# Patient Record
Sex: FEMALE | Race: ASIAN
[De-identification: names, ages, dates, MRNs, and addresses within clinical notes are randomized per-mention and may not be internally consistent; named-entity substitution may affect disease eponyms.]

---

## 2017-03-16 ENCOUNTER — RESULT REVIEW (OUTPATIENT)
Age: 37
End: 2017-03-16

## 2017-05-27 ENCOUNTER — RESULT REVIEW (OUTPATIENT)
Age: 37
End: 2017-05-27

## 2017-12-12 ENCOUNTER — EMERGENCY (EMERGENCY)
Facility: HOSPITAL | Age: 37
LOS: 1 days | Discharge: HOME | End: 2017-12-12
Attending: EMERGENCY MEDICINE | Admitting: EMERGENCY MEDICINE
Payer: COMMERCIAL

## 2017-12-12 VITALS
OXYGEN SATURATION: 99 % | SYSTOLIC BLOOD PRESSURE: 138 MMHG | DIASTOLIC BLOOD PRESSURE: 86 MMHG | HEIGHT: 63 IN | HEART RATE: 81 BPM | RESPIRATION RATE: 18 BRPM | WEIGHT: 165.35 LBS | TEMPERATURE: 99 F

## 2017-12-12 DIAGNOSIS — Z98.890 OTHER SPECIFIED POSTPROCEDURAL STATES: Chronic | ICD-10-CM

## 2017-12-12 LAB
ALBUMIN SERPL ELPH-MCNC: 3.5 G/DL — SIGNIFICANT CHANGE UP (ref 3.3–5)
ALP SERPL-CCNC: 69 U/L — SIGNIFICANT CHANGE UP (ref 40–120)
ALT FLD-CCNC: 22 U/L — SIGNIFICANT CHANGE UP (ref 12–78)
AMYLASE P1 CFR SERPL: 76 U/L — SIGNIFICANT CHANGE UP (ref 25–115)
ANION GAP SERPL CALC-SCNC: 6 MMOL/L — SIGNIFICANT CHANGE UP (ref 5–17)
APPEARANCE UR: CLEAR — SIGNIFICANT CHANGE UP
AST SERPL-CCNC: 18 U/L — SIGNIFICANT CHANGE UP (ref 15–37)
BASOPHILS # BLD AUTO: 0.1 K/UL — SIGNIFICANT CHANGE UP (ref 0–0.2)
BASOPHILS NFR BLD AUTO: 0.6 % — SIGNIFICANT CHANGE UP (ref 0–2)
BILIRUB SERPL-MCNC: 0.3 MG/DL — SIGNIFICANT CHANGE UP (ref 0.2–1.2)
BILIRUB UR-MCNC: NEGATIVE — SIGNIFICANT CHANGE UP
BUN SERPL-MCNC: 9 MG/DL — SIGNIFICANT CHANGE UP (ref 7–23)
CALCIUM SERPL-MCNC: 8.5 MG/DL — SIGNIFICANT CHANGE UP (ref 8.5–10.1)
CHLORIDE SERPL-SCNC: 105 MMOL/L — SIGNIFICANT CHANGE UP (ref 96–108)
CK MB BLD-MCNC: 2.3 % — SIGNIFICANT CHANGE UP (ref 0–3.5)
CK MB CFR SERPL CALC: 1.2 NG/ML — SIGNIFICANT CHANGE UP (ref 0–3.6)
CK SERPL-CCNC: 52 U/L — SIGNIFICANT CHANGE UP (ref 26–192)
CO2 SERPL-SCNC: 28 MMOL/L — SIGNIFICANT CHANGE UP (ref 22–31)
COLOR SPEC: YELLOW — SIGNIFICANT CHANGE UP
CREAT SERPL-MCNC: 0.55 MG/DL — SIGNIFICANT CHANGE UP (ref 0.5–1.3)
DIFF PNL FLD: NEGATIVE — SIGNIFICANT CHANGE UP
EOSINOPHIL # BLD AUTO: 0.3 K/UL — SIGNIFICANT CHANGE UP (ref 0–0.5)
EOSINOPHIL NFR BLD AUTO: 2.7 % — SIGNIFICANT CHANGE UP (ref 0–6)
GLUCOSE SERPL-MCNC: 77 MG/DL — SIGNIFICANT CHANGE UP (ref 70–99)
GLUCOSE UR QL: NEGATIVE — SIGNIFICANT CHANGE UP
HCT VFR BLD CALC: 38.6 % — SIGNIFICANT CHANGE UP (ref 34.5–45)
HGB BLD-MCNC: 12.5 G/DL — SIGNIFICANT CHANGE UP (ref 11.5–15.5)
KETONES UR-MCNC: NEGATIVE — SIGNIFICANT CHANGE UP
LEUKOCYTE ESTERASE UR-ACNC: ABNORMAL
LIDOCAIN IGE QN: 287 U/L — SIGNIFICANT CHANGE UP (ref 73–393)
LYMPHOCYTES # BLD AUTO: 2.9 K/UL — SIGNIFICANT CHANGE UP (ref 1–3.3)
LYMPHOCYTES # BLD AUTO: 23.9 % — SIGNIFICANT CHANGE UP (ref 13–44)
MCHC RBC-ENTMCNC: 28.6 PG — SIGNIFICANT CHANGE UP (ref 27–34)
MCHC RBC-ENTMCNC: 32.5 GM/DL — SIGNIFICANT CHANGE UP (ref 32–36)
MCV RBC AUTO: 88 FL — SIGNIFICANT CHANGE UP (ref 80–100)
MONOCYTES # BLD AUTO: 0.7 K/UL — SIGNIFICANT CHANGE UP (ref 0–0.9)
MONOCYTES NFR BLD AUTO: 6 % — SIGNIFICANT CHANGE UP (ref 1–9)
NEUTROPHILS # BLD AUTO: 8.2 K/UL — HIGH (ref 1.8–7.4)
NEUTROPHILS NFR BLD AUTO: 66.8 % — SIGNIFICANT CHANGE UP (ref 43–77)
NITRITE UR-MCNC: NEGATIVE — SIGNIFICANT CHANGE UP
PH UR: 7 — SIGNIFICANT CHANGE UP (ref 5–8)
PLATELET # BLD AUTO: 245 K/UL — SIGNIFICANT CHANGE UP (ref 150–400)
POTASSIUM SERPL-MCNC: 3.5 MMOL/L — SIGNIFICANT CHANGE UP (ref 3.5–5.3)
POTASSIUM SERPL-SCNC: 3.5 MMOL/L — SIGNIFICANT CHANGE UP (ref 3.5–5.3)
PROT SERPL-MCNC: 7.7 G/DL — SIGNIFICANT CHANGE UP (ref 6–8.3)
PROT UR-MCNC: NEGATIVE — SIGNIFICANT CHANGE UP
RBC # BLD: 4.38 M/UL — SIGNIFICANT CHANGE UP (ref 3.8–5.2)
RBC # FLD: 11.8 % — SIGNIFICANT CHANGE UP (ref 10.3–14.5)
SODIUM SERPL-SCNC: 139 MMOL/L — SIGNIFICANT CHANGE UP (ref 135–145)
SP GR SPEC: 1 — LOW (ref 1.01–1.02)
TROPONIN I SERPL-MCNC: <.015 NG/ML — SIGNIFICANT CHANGE UP (ref 0.01–0.04)
UROBILINOGEN FLD QL: NEGATIVE — SIGNIFICANT CHANGE UP
WBC # BLD: 12.3 K/UL — HIGH (ref 3.8–10.5)
WBC # FLD AUTO: 12.3 K/UL — HIGH (ref 3.8–10.5)

## 2017-12-12 PROCEDURE — 99284 EMERGENCY DEPT VISIT MOD MDM: CPT

## 2017-12-12 RX ORDER — SODIUM CHLORIDE 9 MG/ML
3 INJECTION INTRAMUSCULAR; INTRAVENOUS; SUBCUTANEOUS ONCE
Qty: 0 | Refills: 0 | Status: COMPLETED | OUTPATIENT
Start: 2017-12-12 | End: 2017-12-12

## 2017-12-12 RX ADMIN — SODIUM CHLORIDE 3 MILLILITER(S): 9 INJECTION INTRAMUSCULAR; INTRAVENOUS; SUBCUTANEOUS at 21:48

## 2017-12-12 NOTE — ED ADULT TRIAGE NOTE - CHIEF COMPLAINT QUOTE
9 weeks pregnant, states she is fatigue and intermittent chest pressure only at night. for last 5 days, history of open heart surgery 6 years ago.

## 2017-12-12 NOTE — ED ADULT NURSE NOTE - OBJECTIVE STATEMENT
c/o "I feel like someone is squeezing my chest"  and I am weak.  reports being pregnant in 1st trimester.  denies SOB skin warm and dry

## 2017-12-13 VITALS
RESPIRATION RATE: 16 BRPM | OXYGEN SATURATION: 99 % | TEMPERATURE: 98 F | DIASTOLIC BLOOD PRESSURE: 72 MMHG | HEART RATE: 65 BPM | SYSTOLIC BLOOD PRESSURE: 109 MMHG

## 2017-12-13 LAB — HCG SERPL-ACNC: SIGNIFICANT CHANGE UP MIU/ML

## 2017-12-13 PROCEDURE — 99284 EMERGENCY DEPT VISIT MOD MDM: CPT | Mod: 25

## 2017-12-13 PROCEDURE — 36415 COLL VENOUS BLD VENIPUNCTURE: CPT

## 2017-12-13 PROCEDURE — 85027 COMPLETE CBC AUTOMATED: CPT

## 2017-12-13 PROCEDURE — 84484 ASSAY OF TROPONIN QUANT: CPT

## 2017-12-13 PROCEDURE — 84702 CHORIONIC GONADOTROPIN TEST: CPT

## 2017-12-13 PROCEDURE — 82550 ASSAY OF CK (CPK): CPT

## 2017-12-13 PROCEDURE — 82150 ASSAY OF AMYLASE: CPT

## 2017-12-13 PROCEDURE — 93005 ELECTROCARDIOGRAM TRACING: CPT

## 2017-12-13 PROCEDURE — 81001 URINALYSIS AUTO W/SCOPE: CPT

## 2017-12-13 PROCEDURE — 87086 URINE CULTURE/COLONY COUNT: CPT

## 2017-12-13 PROCEDURE — 83690 ASSAY OF LIPASE: CPT

## 2017-12-13 PROCEDURE — 80053 COMPREHEN METABOLIC PANEL: CPT

## 2017-12-13 PROCEDURE — 82553 CREATINE MB FRACTION: CPT

## 2017-12-13 RX ORDER — CEPHALEXIN 500 MG
500 CAPSULE ORAL ONCE
Qty: 0 | Refills: 0 | Status: COMPLETED | OUTPATIENT
Start: 2017-12-13 | End: 2017-12-13

## 2017-12-13 RX ORDER — CEPHALEXIN 500 MG
1 CAPSULE ORAL
Qty: 14 | Refills: 0 | OUTPATIENT
Start: 2017-12-13 | End: 2017-12-19

## 2017-12-13 RX ADMIN — Medication 500 MILLIGRAM(S): at 01:45

## 2017-12-13 NOTE — ED PROVIDER NOTE - PROGRESS NOTE DETAILS
Spoke with Dr. Valentino, results reviewed.  He does not believe that this is cardiac in nature.  Pt can be discharged home with follow up in the office.  Reviewed with pt and family all questions answered stable for discharge home with outpatient follow up

## 2017-12-13 NOTE — ED PROVIDER NOTE - MEDICAL DECISION MAKING DETAILS
cbc, cmp, amylase, lipase, UA, urine culture, pregnancy, EKG, cardiac enzymes, consult with pt cardiologist

## 2017-12-13 NOTE — ED PROVIDER NOTE - PLAN OF CARE
Return to the ED for any new or worsening symptoms  Take your medication as prescribed  Keflex 1 tab 2 times for UTI finish the prescription  Continue your pre- vitamins   Follow up with cardiology call tomorrow to schedule an appointment   Follow up with your OBGYN call tomorrow to schedule follow up   Increase your water intake   Advance activity as tolerated

## 2017-12-13 NOTE — ED PROVIDER NOTE - OBJECTIVE STATEMENT
Pt is a 38 yo female who presents to the ED with a cc of chest pressure.  PMHx of congential atrial abnormality s/p repair.  Pt reports that she is approx 9 weeks pregnant.  She is a  s/p normal spontaneous vaginal delivery.  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  The pregnancy had to be terminated because of increased stress on the heart and pt underwent surgerical repair with a Dr. Valentino.  She reports that this pregnancy has been without complication.  She has had an ultrasound to confirm intrauterine growth and follows with [facility]. She states that on Friday she developed a pain to her left upper chest wall intermittent in nature.  She reports that it feels like someone is squeezing her.  Symptoms are self limited lasting 15-20 min at a time and are associated with SOB.  She reports that they will occasionally awake her from sleep.  She further reports increased fatigue, and chills.  Pt did have nausea and vomiting approx 2 days ago which have since resolved.    Denies fever, D/C, cough, abdominal pain, vaginal bleeding or discharge.  Denies dysuria, frequency, urgency.  Pt reports that the pain is not related to exertion but appears worse at night.    LMP 10/12.  Pt is currently chest pain free

## 2017-12-14 LAB
CULTURE RESULTS: NO GROWTH — SIGNIFICANT CHANGE UP
SPECIMEN SOURCE: SIGNIFICANT CHANGE UP

## 2017-12-15 ENCOUNTER — APPOINTMENT (OUTPATIENT)
Dept: OBGYN | Facility: CLINIC | Age: 37
End: 2017-12-15

## 2018-03-12 ENCOUNTER — OUTPATIENT (OUTPATIENT)
Dept: OUTPATIENT SERVICES | Age: 38
LOS: 1 days | Discharge: HOME | End: 2018-03-12

## 2018-03-12 DIAGNOSIS — Z98.890 OTHER SPECIFIED POSTPROCEDURAL STATES: Chronic | ICD-10-CM

## 2018-03-23 ENCOUNTER — APPOINTMENT (OUTPATIENT)
Dept: PEDIATRIC CARDIOLOGY | Facility: CLINIC | Age: 38
End: 2018-03-23
Payer: COMMERCIAL

## 2018-03-23 PROCEDURE — 76827 ECHO EXAM OF FETAL HEART: CPT

## 2018-03-23 PROCEDURE — 93325 DOPPLER ECHO COLOR FLOW MAPG: CPT

## 2018-03-23 PROCEDURE — 76825 ECHO EXAM OF FETAL HEART: CPT

## 2018-05-11 ENCOUNTER — EMERGENCY (EMERGENCY)
Facility: HOSPITAL | Age: 38
LOS: 1 days | Discharge: HOME | End: 2018-05-11
Attending: EMERGENCY MEDICINE | Admitting: EMERGENCY MEDICINE
Payer: COMMERCIAL

## 2018-05-11 VITALS
DIASTOLIC BLOOD PRESSURE: 70 MMHG | OXYGEN SATURATION: 100 % | RESPIRATION RATE: 16 BRPM | SYSTOLIC BLOOD PRESSURE: 122 MMHG | HEART RATE: 78 BPM

## 2018-05-11 VITALS
TEMPERATURE: 98 F | OXYGEN SATURATION: 100 % | SYSTOLIC BLOOD PRESSURE: 118 MMHG | DIASTOLIC BLOOD PRESSURE: 84 MMHG | HEART RATE: 93 BPM | RESPIRATION RATE: 16 BRPM

## 2018-05-11 DIAGNOSIS — R07.89 OTHER CHEST PAIN: ICD-10-CM

## 2018-05-11 DIAGNOSIS — Z98.890 OTHER SPECIFIED POSTPROCEDURAL STATES: Chronic | ICD-10-CM

## 2018-05-11 LAB
ALBUMIN SERPL ELPH-MCNC: 3.7 G/DL — SIGNIFICANT CHANGE UP (ref 3.3–5)
ALP SERPL-CCNC: 113 U/L — SIGNIFICANT CHANGE UP (ref 40–120)
ALT FLD-CCNC: 13 U/L — SIGNIFICANT CHANGE UP (ref 4–33)
APTT BLD: 28.3 SEC — SIGNIFICANT CHANGE UP (ref 27.5–37.4)
AST SERPL-CCNC: 16 U/L — SIGNIFICANT CHANGE UP (ref 4–32)
BASE EXCESS BLDV CALC-SCNC: 0.6 MMOL/L — SIGNIFICANT CHANGE UP
BASOPHILS # BLD AUTO: 0.03 K/UL — SIGNIFICANT CHANGE UP (ref 0–0.2)
BASOPHILS NFR BLD AUTO: 0.2 % — SIGNIFICANT CHANGE UP (ref 0–2)
BILIRUB SERPL-MCNC: 0.3 MG/DL — SIGNIFICANT CHANGE UP (ref 0.2–1.2)
BLOOD GAS VENOUS - CREATININE: 0.6 MG/DL — SIGNIFICANT CHANGE UP (ref 0.5–1.3)
BUN SERPL-MCNC: 6 MG/DL — LOW (ref 7–23)
CALCIUM SERPL-MCNC: 9.1 MG/DL — SIGNIFICANT CHANGE UP (ref 8.4–10.5)
CHLORIDE BLDV-SCNC: 106 MMOL/L — SIGNIFICANT CHANGE UP (ref 96–108)
CHLORIDE SERPL-SCNC: 100 MMOL/L — SIGNIFICANT CHANGE UP (ref 98–107)
CK SERPL-CCNC: 40 U/L — SIGNIFICANT CHANGE UP (ref 25–170)
CO2 SERPL-SCNC: 26 MMOL/L — SIGNIFICANT CHANGE UP (ref 22–31)
CREAT SERPL-MCNC: 0.63 MG/DL — SIGNIFICANT CHANGE UP (ref 0.5–1.3)
EOSINOPHIL # BLD AUTO: 0.16 K/UL — SIGNIFICANT CHANGE UP (ref 0–0.5)
EOSINOPHIL NFR BLD AUTO: 1.3 % — SIGNIFICANT CHANGE UP (ref 0–6)
GAS PNL BLDV: 136 MMOL/L — SIGNIFICANT CHANGE UP (ref 136–146)
GLUCOSE BLDV-MCNC: 80 — SIGNIFICANT CHANGE UP (ref 70–99)
GLUCOSE SERPL-MCNC: 98 MG/DL — SIGNIFICANT CHANGE UP (ref 70–99)
HCO3 BLDV-SCNC: 22 MMOL/L — SIGNIFICANT CHANGE UP (ref 20–27)
HCT VFR BLD CALC: 40.5 % — SIGNIFICANT CHANGE UP (ref 34.5–45)
HCT VFR BLDV CALC: 40.8 % — SIGNIFICANT CHANGE UP (ref 34.5–45)
HGB BLD-MCNC: 12.9 G/DL — SIGNIFICANT CHANGE UP (ref 11.5–15.5)
HGB BLDV-MCNC: 13.3 G/DL — SIGNIFICANT CHANGE UP (ref 11.5–15.5)
IMM GRANULOCYTES # BLD AUTO: 0.08 # — SIGNIFICANT CHANGE UP
IMM GRANULOCYTES NFR BLD AUTO: 0.7 % — SIGNIFICANT CHANGE UP (ref 0–1.5)
INR BLD: 0.97 — SIGNIFICANT CHANGE UP (ref 0.88–1.17)
LACTATE BLDV-MCNC: 2 MMOL/L — SIGNIFICANT CHANGE UP (ref 0.5–2)
LYMPHOCYTES # BLD AUTO: 1.79 K/UL — SIGNIFICANT CHANGE UP (ref 1–3.3)
LYMPHOCYTES # BLD AUTO: 14.9 % — SIGNIFICANT CHANGE UP (ref 13–44)
MCHC RBC-ENTMCNC: 27.9 PG — SIGNIFICANT CHANGE UP (ref 27–34)
MCHC RBC-ENTMCNC: 31.9 % — LOW (ref 32–36)
MCV RBC AUTO: 87.7 FL — SIGNIFICANT CHANGE UP (ref 80–100)
MONOCYTES # BLD AUTO: 0.47 K/UL — SIGNIFICANT CHANGE UP (ref 0–0.9)
MONOCYTES NFR BLD AUTO: 3.9 % — SIGNIFICANT CHANGE UP (ref 2–14)
NEUTROPHILS # BLD AUTO: 9.52 K/UL — HIGH (ref 1.8–7.4)
NEUTROPHILS NFR BLD AUTO: 79 % — HIGH (ref 43–77)
NRBC # FLD: 0 — SIGNIFICANT CHANGE UP
PCO2 BLDV: 54 MMHG — HIGH (ref 41–51)
PH BLDV: 7.3 PH — LOW (ref 7.32–7.43)
PLATELET # BLD AUTO: 234 K/UL — SIGNIFICANT CHANGE UP (ref 150–400)
PMV BLD: 11.9 FL — SIGNIFICANT CHANGE UP (ref 7–13)
PO2 BLDV: < 24 MMHG — LOW (ref 35–40)
POTASSIUM BLDV-SCNC: 3.7 MMOL/L — SIGNIFICANT CHANGE UP (ref 3.4–4.5)
POTASSIUM SERPL-MCNC: 3.9 MMOL/L — SIGNIFICANT CHANGE UP (ref 3.5–5.3)
POTASSIUM SERPL-SCNC: 3.9 MMOL/L — SIGNIFICANT CHANGE UP (ref 3.5–5.3)
PROT SERPL-MCNC: 7.1 G/DL — SIGNIFICANT CHANGE UP (ref 6–8.3)
PROTHROM AB SERPL-ACNC: 10.8 SEC — SIGNIFICANT CHANGE UP (ref 9.8–13.1)
RBC # BLD: 4.62 M/UL — SIGNIFICANT CHANGE UP (ref 3.8–5.2)
RBC # FLD: 12.8 % — SIGNIFICANT CHANGE UP (ref 10.3–14.5)
SAO2 % BLDV: 23.9 % — LOW (ref 60–85)
SODIUM SERPL-SCNC: 138 MMOL/L — SIGNIFICANT CHANGE UP (ref 135–145)
TROPONIN T SERPL-MCNC: < 0.06 NG/ML — SIGNIFICANT CHANGE UP (ref 0–0.06)
WBC # BLD: 12.05 K/UL — HIGH (ref 3.8–10.5)
WBC # FLD AUTO: 12.05 K/UL — HIGH (ref 3.8–10.5)

## 2018-05-11 PROCEDURE — 93306 TTE W/DOPPLER COMPLETE: CPT | Mod: 26

## 2018-05-11 PROCEDURE — 99285 EMERGENCY DEPT VISIT HI MDM: CPT

## 2018-05-11 PROCEDURE — 99284 EMERGENCY DEPT VISIT MOD MDM: CPT

## 2018-05-11 NOTE — ED PROVIDER NOTE - PLAN OF CARE
You were evaluated by cardiology and had an echocardiogram. Follow up with your PMD or cardiologist within 1-2 days of discharge.

## 2018-05-11 NOTE — CONSULT NOTE ADULT - ASSESSMENT
Impression: 38 y/o F  PMHx significant for unspecified abnormality of RA, s/p surgical procedure in 2012 at [facility] who p/w intermittent L sided chest pain and dyspnea x 3 days. Jorge negative x 1, pending Echo.    Recommendations:  - To be d/w attending Impression: 36 y/o F  PMHx significant for unspecified abnormality of RA, s/p surgical procedure in 2012 at [facility] who p/w intermittent L sided chest pain and dyspnea x 3 days. Jorge negative x 1, pending Echo. Low suspicion of PE.    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 Jorge negative x 1, pending Echo. Low suspicion of PE.    Recommendations:  - No need to further trend Jorge  - F/u TTE  - Cardiology team will continue to follow    Plan d/w EM Attending

## 2018-05-11 NOTE — ED PROVIDER NOTE - CARE PLAN
Principal Discharge DX:	Chest pressure  Assessment and plan of treatment:	You were evaluated by cardiology and had an echocardiogram. Follow up with your PMD or cardiologist within 1-2 days of discharge.

## 2018-05-11 NOTE — CHART NOTE - NSCHARTNOTEFT_GEN_A_CORE
Pt seen and evaluated by cardiology team.  By hx and according to cardiologist, thus have triscuspid regurgitation.       Plan  - TTE pending  - if normal, preserved EF, no evidence of severe MR, no cardiac contraindications for discharge     Jarrett Giron MD   [facility] [id number]

## 2018-05-11 NOTE — ED ADULT TRIAGE NOTE - CHIEF COMPLAINT QUOTE
30 weeks pregnancy, , c/o chest pressure, fatigue, and shortness of breath x few days, Discussed L&D MADI Cesar, to be seen in ED.    Denies: dizziness, HA, abdominal pain, vaginal discharge. Hx: congenital heart murmur   OSCAR: 2018

## 2018-05-11 NOTE — ED PROVIDER NOTE - OBJECTIVE STATEMENT
36 yo F pregnant female presenting with intermittent chest pain x 3 days. Pt has uncertain congenital atrial abnormality which required cardiac surgery - corrected in 2012. Came to ED because of concern of chest pain. Also being monitored for possible gestational diabetes. Denies fevers, chills, cough, rhinorrhea, otorrhea, otalgia, nausea, vomiting, constipation, diarrhea, shortness of breath or changes in urinary habits. Pt also complains of fatigue and weakness.

## 2018-05-11 NOTE — ED PROVIDER NOTE - MEDICAL DECISION MAKING DETAILS
39 yo F 30 week preg - OB for Fetal tracing given gestation, labs, cards for echo - no SOB, LE edema, calf tenderness, tachycardia or hypoxia - no concern for PE given history

## 2018-05-11 NOTE — CONSULT NOTE ADULT - SUBJECTIVE AND OBJECTIVE BOX
HPI    37y  at 30wks who presents with left sided chest pressure for the past 3 days. Patient has a PMH of congenital right heart disease. Per patient, she was born with a murmur and her "right ventricle" was not developing so she had open heart surgery in  with [facility] Dr. Gutierrez. She has been seeing Dr. Gutierrez throughout her pregnancy and had ECHOS recently which were WNL.    +FM, no LOF, no VB, no CTXs.    OB/GYN HISTORY:  x 1  PAST MEDICAL & SURGICAL HISTORY:  Congenital abnormality of right atrium  History of open heart surgery    Allergies  No Known Allergies    MEDICATIONS  (STANDING): none    MEDICATIONS  (PRN): none    FAMILY HISTORY:  No pertinent family history in first degree relatives    SOCIAL HISTORY:  REVIEW OF SYSTEMS:    CONSTITUTIONAL: No weakness, fevers or chills  EYES/ENT: No visual changes;  No vertigo or throat pain   NECK: No pain or stiffness  RESPIRATORY: shortness of breath  CARDIOVASCULAR: chest heaviness  GASTROINTESTINAL: No abdominal or epigastric pain. No nausea, vomiting, or hematemesis; No diarrhea or constipation. No melena or hematochezia.  GENITOURINARY: No dysuria, frequency or hematuria  NEUROLOGICAL: No numbness or weakness  SKIN: No itching, burning, rashes, or lesions   All other review of systems is negative unless indicated above.    Name of GYN Physician: Suman LEW    Vital Signs Last 24 Hrs  T(C): 36.6 (11 May 2018 11:24), Max: 36.6 (11 May 2018 11:24)  T(F): 97.9 (11 May 2018 11:24), Max: 97.9 (11 May 2018 11:24)  HR: 84 (11 May 2018 13:33) (84 - 93)  BP: 118/74 (11 May 2018 13:33) (118/74 - 118/84)  BP(mean): --  RR: 18 (11 May 2018 13:33) (16 - 18)  SpO2: 100% (11 May 2018 13:33) (100% - 100%)    PHYSICAL EXAM:    Constitutional: alert and oriented x 3  Abd: soft, gravid, nontender  Ext: NTBL    LABS:                        12.9   12.05 )-----------( 234      ( 11 May 2018 12:10 )             40.5     05-11    138  |  100  |  6<L>  ----------------------------<  98  3.9   |  26  |  0.63    Ca    9.1      11 May 2018 12:10    TPro  7.1  /  Alb  3.7  /  TBili  0.3  /  DBili  x   /  AST  16  /  ALT  13  /  AlkPhos  113  05-11    PT/INR - ( 11 May 2018 12:10 )   PT: 10.8 SEC;   INR: 0.97          PTT - ( 11 May 2018 12:10 )  PTT:28.3 SEC

## 2018-05-11 NOTE — ED ADULT NURSE NOTE - OBJECTIVE STATEMENT
pt alert,oriented x3. skin warm,dry. c/o increasing diff breathing. ch pains x past 3 days. reports tiredness. iv access,labs sent. c monitor rsr,mds to eval . denies abd pains,bleeding,spotting

## 2018-05-11 NOTE — ED ADULT NURSE REASSESSMENT NOTE - NS ED NURSE REASSESS COMMENT FT1
pt eval by obgyn awaits bedside echo. remains alert,oriented x3.. denies c/o ch pains,diff  breathing pt at rest.

## 2018-05-11 NOTE — CONSULT NOTE ADULT - SUBJECTIVE AND OBJECTIVE BOX
Wero Adler MD  PGY-1 | Preliminary Resident  Department of Internal Medicine  [phone] ([facility]) | [id number] ([facility])    Patient seen and evaluated at bedside    Chief Complaint: L chest pain, dyspnea    HPI: 36 y/o F  PMHx significant for unspecified open heart procedure (patient states she was born with a "heart murmur" and states her issue involved "Muscle growth" in R heart) in  at [facility] who p/w intermittent L sided chest pain and dyspnea x 3 days. Patient states she has been experiencing brief, seconds-long episodes of L sides chest pain characterized as "pressure." She denies radiation to the jaw or arms. No provocating or palliating factors noted. Pain is not worsened with breathing or movement. Patient states she was in the car for about 2 hours this weekend during a trip to [place], but denies any longer trips recently. She denies any sensation of palpitations, orthopnea, or PND; however, she has been having occasional difficulty sleeping and her  notes she breathes deeply at times when she has trouble falling asleep. Patient also says she "feels underwater" when these episodes of chest pain occur. Patient notes having similar symptoms in December when she was ~9 weeks pregnant, got worked up and was reassured her symptoms were not cardiac in origin.     PMHx:   Congenital abnormality of right atrium      PSHx:   History of open heart surgery  Appendectomy 2009      Allergies:  No Known Allergies      Medications:   Pre- vitamins    FAMILY HISTORY:  CAD, father s/p angioplasty  HTN, T2DM, mother    Social History: Just took leave from work. Works as a  in [place]. Lives with  at home  Smoking History: Denies  Alcohol Use: Denies  Drug Use: Denies    Review of Systems:  REVIEW OF SYSTEMS:  HEENT: +Nasal congestion  CARDIOVASCULAR: Per HPI  All other review of systems is negative unless indicated above.    Physical Exam:  T(F): 97.9 (05-11), Max: 97.9 (-11)  HR: 93 (-11) (93 - 93)  BP: 118/84 (-11) (118/84 - 118/84)  RR: 16 (-11)  SpO2: 100% (-)  GENERAL: No acute distress, well-developed  HEAD:  Atraumatic, Normocephalic  ENT: EOMI, PERRLA, conjunctiva and sclera clear, Neck supple, No JVD, moist mucosa  CHEST/LUNG: Diminished breath sounds at B/L bases  BACK: No spinal tenderness  HEART: Regular rate and rhythm; 1/6 systolic murmur LUSB. Trace LE edema B/L  ABDOMEN: Gravid. +BS  EXTREMITIES:  No erythema or unilateral swelling of LE. No pain to palpation of calves.   PSYCH: Nl behavior, nl affect  NEUROLOGY: AAOx3, non-focal, cranial nerves intact  SKIN: Normal color, No rashes or lesions  LINES: PIV    Cardiovascular Diagnostic Testing:    ECG: Personally reviewed [X} NSR. incomplete RBBB                                                         Echo: Personally reviewed: < from: Echocardiogram, Pediatric (18 @ 09:16) >  Summary:   1. Normal fetal echocardiogram.   2. Fetal heart rhythm during exam: normal fetal rhythm. Brief short episodes of sinus bradycardia during study (mother said she did not eat anything for breakfast).   3. The mechanical IA interval is normal at 116 msec.   4. Findings of the fetal study were discussed with the patient. Intrauterine course and post kayla course of the above cardiac findings were also discussed.   5. Mother going to obstetrician tomorrow.    < end of copied text >      Stress Testing:    Cath:    Imaging:    CXR: Personally reviewed    Labs: Personally reviewed                        12.   1205 )-----------( 234      ( 11 May 2018 12:10 )             40.5     05-11    138  |  100  |  6<L>  ----------------------------<  98  3.9   |  26  |  0.63    Ca    9.1      11 May 2018 12:10    TPro  7.1  /  Alb  3.7  /  TBili  0.3  /  DBili  x   /  AST  16  /  ALT  13  /  AlkPhos  113  05-11    PT/INR - ( 11 May 2018 12:10 )   PT: 10.8 SEC;   INR: 0.97          PTT - ( 11 May 2018 12:10 )  PTT:28.3 SEC  CARDIAC MARKERS ( 11 May 2018 12:10 )  x     / < 0.06 ng/mL / 40 u/L / x     / x Wero Adler MD  PGY-1 | Preliminary Resident  Department of Internal Medicine  [phone] ([facility]) | [phone] ([facility]    Patient seen and evaluated at bedside    Chief Complaint: L chest pain, dyspnea    HPI: 36 y/o F  PMHx significant for unspecified open heart procedure (patient states she was born with a "heart murmur" and states her issue involved "Muscle growth" in R heart) in  at [facility] who p/w intermittent L sided chest pain and dyspnea x 3 days. Patient states she has been experiencing brief, seconds-long episodes of L sided chest pain characterized as "pressure." She denies radiation to the jaw or arms. No provocating or palliating factors noted. Pain is not worsened with breathing or movement. Patient states she was in the car for about 2 hours this weekend during a trip to [place], but denies any longer trips recently. She denies any sensation of palpitations, orthopnea, or PND; however, she has been having occasional difficulty sleeping and her  notes she breathes deeply at times when she has trouble falling asleep. Patient also says she "feels underwater" when these episodes of chest pain occur. Patient notes having similar symptoms in December when she was ~9 weeks pregnant, got worked up and was reassured her symptoms were not cardiac in origin.     PMHx:   Congenital abnormality of right atrium      PSHx:   History of open heart surgery  Appendectomy 2009      Allergies:  No Known Allergies      Medications:   Pre- vitamins    FAMILY HISTORY:  CAD, father s/p angioplasty  HTN, T2DM, mother    Social History: Just took leave from work. Works as a  in [place]. Lives with  at home.  Smoking History: Denies  Alcohol Use: Denies  Drug Use: Denies    Review of Systems:  REVIEW OF SYSTEMS:  HEENT: +Nasal congestion  CARDIOVASCULAR: Per HPI  All other review of systems is negative unless indicated above.    Physical Exam:  T(F): 97.9 (05-11), Max: 97.9 (-11)  HR: 93 (-11) (93 - 93)  BP: 118/84 (05-11) (118/84 - 118/84)  RR: 16 (-11)  SpO2: 100% (-)  GENERAL: No acute distress, well-developed  HEAD:  Atraumatic, Normocephalic  ENT: EOMI, PERRLA, conjunctiva and sclera clear, Neck supple, No JVD, moist mucosa  CHEST/LUNG: Diminished breath sounds at B/L bases  BACK: No spinal tenderness  HEART: Regular rate and rhythm; 1/6 systolic murmur LUSB. Trace LE edema B/L  ABDOMEN: Gravid. +BS  EXTREMITIES:  No erythema or unilateral swelling of LE. No pain to palpation of calves.   PSYCH: Nl behavior, nl affect  NEUROLOGY: AAOx3, non-focal, cranial nerves intact  SKIN: Normal color, No rashes or lesions  LINES: PIV    Cardiovascular Diagnostic Testing:    ECG: Personally reviewed [X} NSR. incomplete RBBB                                                         Echo: Personally reviewed: < from: Echocardiogram, Pediatric (18 @ 09:16) >  Summary:   1. Normal fetal echocardiogram.   2. Fetal heart rhythm during exam: normal fetal rhythm. Brief short episodes of sinus bradycardia during study (mother said she did not eat anything for breakfast).   3. The mechanical MI interval is normal at 116 msec.   4. Findings of the fetal study were discussed with the patient. Intrauterine course and post kayla course of the above cardiac findings were also discussed.   5. Mother going to obstetrician tomorrow.    < end of copied text >      Stress Testing:    Cath:    Imaging:    CXR: Personally reviewed    Labs: Personally reviewed                        12.05 )-----------( 234      ( 11 May 2018 12:10 )             40.5     05-11    138  |  100  |  6<L>  ----------------------------<  98  3.9   |  26  |  0.63    Ca    9.1      11 May 2018 12:10    TPro  7.1  /  Alb  3.7  /  TBili  0.3  /  DBili  x   /  AST  16  /  ALT  13  /  AlkPhos  113  05-11    PT/INR - ( 11 May 2018 12:10 )   PT: 10.8 SEC;   INR: 0.97          PTT - ( 11 May 2018 12:10 )  PTT:28.3 SEC  CARDIAC MARKERS ( 11 May 2018 12:10 )  x     / < 0.06 ng/mL / 40 u/L / x     / x Wero Adler MD  PGY-1 | Preliminary Resident  Department of Internal Medicine  [phone] ([facility]) | [phone] ([facility])    Patient seen and evaluated at bedside    Chief Complaint: L chest pain, dyspnea    HPI: 36 y/o F  PMHx significant for unspecified open heart procedure (patient states she was born with a "heart murmur" and states her issue involved "Muscle growth" in R heart) in  at [facility] who p/w intermittent L sided chest pain and dyspnea x 3 days. Patient states she has been experiencing brief, seconds-long episodes of L sided chest pain characterized as "pressure." She denies radiation to the jaw or arms. No provocating or palliating factors noted. Pain is not worsened with breathing or movement. Patient states she was in the car for about 2 hours this weekend during a trip to [place], but denies any longer trips recently. She denies any sensation of palpitations, orthopnea, or PND; however, she has been having occasional difficulty sleeping and her  notes she breathes deeply at times when she has trouble falling asleep. Patient also says she "feels underwater" when these episodes of chest pain occur. Patient notes having similar symptoms in December when she was ~9 weeks pregnant, got worked up and was reassured her symptoms were not cardiac in origin.     PMHx:   Congenital abnormality of right atrium      PSHx:   History of open heart surgery  Appendectomy 2009      Allergies:  No Known Allergies      Medications:   Pre- vitamins    FAMILY HISTORY:  CAD, father s/p angioplasty  HTN, T2DM, mother    Social History: Just took leave from work. Works as a  in [place]. Lives with  at home.  Smoking History: Denies  Alcohol Use: Denies  Drug Use: Denies    Review of Systems:  REVIEW OF SYSTEMS:  HEENT: +Nasal congestion  CARDIOVASCULAR: Per HPI  All other review of systems is negative unless indicated above.    Physical Exam:  T(F): 97.9 (05-11), Max: 97.9 (-11)  HR: 93 (-11) (93 - 93)  BP: 118/84 (05-11) (118/84 - 118/84)  RR: 16 (-11)  SpO2: 100% (-)  GENERAL: No acute distress, well-developed  HEAD:  Atraumatic, Normocephalic  ENT: EOMI, PERRLA, conjunctiva and sclera clear, Neck supple, No JVD, moist mucosa  CHEST/LUNG: Diminished breath sounds at B/L bases  BACK: No spinal tenderness  HEART: Regular rate and rhythm; 1/6 systolic murmur LUSB. Trace LE edema B/L  ABDOMEN: Gravid. +BS  EXTREMITIES:  No erythema or unilateral swelling of LE. No pain to palpation of calves.   PSYCH: Nl behavior, nl affect  NEUROLOGY: AAOx3, non-focal, cranial nerves intact  SKIN: Normal color, No rashes or lesions  LINES: PIV    Cardiovascular Diagnostic Testing:    ECG: Personally reviewed [X} NSR. incomplete RBBB                                                         Echo: Personally reviewed: < from: Echocardiogram, Pediatric (18 @ 09:16) >  Summary:   1. Normal fetal echocardiogram.   2. Fetal heart rhythm during exam: normal fetal rhythm. Brief short episodes of sinus bradycardia during study (mother said she did not eat anything for breakfast).   3. The mechanical CO interval is normal at 116 msec.   4. Findings of the fetal study were discussed with the patient. Intrauterine course and post kayla course of the above cardiac findings were also discussed.   5. Mother going to obstetrician tomorrow.    < end of copied text >      Stress Testing:    Cath:    Imaging:    CXR: Personally reviewed    Labs: Personally reviewed                        12.05 )-----------( 234      ( 11 May 2018 12:10 )             40.5     05-11    138  |  100  |  6<L>  ----------------------------<  98  3.9   |  26  |  0.63    Ca    9.1      11 May 2018 12:10    TPro  7.1  /  Alb  3.7  /  TBili  0.3  /  DBili  x   /  AST  16  /  ALT  13  /  AlkPhos  113  05-11    PT/INR - ( 11 May 2018 12:10 )   PT: 10.8 SEC;   INR: 0.97          PTT - ( 11 May 2018 12:10 )  PTT:28.3 SEC  CARDIAC MARKERS ( 11 May 2018 12:10 )  x     / < 0.06 ng/mL / 40 u/L / x     / x Wero Adler MD  PGY-1 | Preliminary Resident  Department of Internal Medicine  [phone] ([facility]) | [phone] (J)    Cardiology: Chicho ([facility])  CT surgery: Brenda ([facility])  OB: Cheri-Shar  PMD: Stalek    Chief Complaint: L chest pain, dyspnea    HPI: 36 y/o F  PMHx significant for unspecified open heart procedure (patient states she was born with a "heart murmur" and states her issue involved "Muscle growth" in R heart) in  at [facility] (Dr. Gutierrez) who p/w intermittent L sided chest pain and dyspnea x 3 days. Patient states she has been experiencing brief, seconds-long episodes of L sided chest pain characterized as "pressure." She denies radiation to the jaw or arms. No provocating or palliating factors noted. Pain is not worsened with breathing or movement. Patient states she was in the car for about 2 hours this weekend during a trip to [place], but denies any longer trips recently. She denies any sensation of palpitations, orthopnea, or PND; however, she has been having occasional difficulty sleeping and her  notes she breathes deeply at times when she has trouble falling asleep. Patient also says she "feels underwater" when these episodes of chest pain occur. Patient notes having similar symptoms in December when she was ~9 weeks pregnant, got worked up and was reassured her symptoms were not cardiac in origin.     PMHx:   Congenital abnormality of right atrium      PSHx:   History of open heart surgery  Appendectomy 2009      Allergies:  No Known Allergies      Medications:   Pre-kayla vitamins    FAMILY HISTORY:  CAD, father s/p angioplasty  HTN, T2DM, mother    Social History: Just took leave from work. Works as a  in [place]. Lives with  at home.  Smoking History: Denies  Alcohol Use: Denies  Drug Use: Denies    Review of Systems:  REVIEW OF SYSTEMS:  HEENT: +Nasal congestion  CARDIOVASCULAR: Per HPI  All other review of systems is negative unless indicated above.    Physical Exam:  T(F): 97.9 (05-11), Max: 97.9 (05-11)  HR: 93 (05-11) (93 - 93)  BP: 118/84 (05-11) (118/84 - 118/84)  RR: 16 (-)  SpO2: 100% ()  GENERAL: No acute distress, well-developed  HEAD:  Atraumatic, Normocephalic  ENT: EOMI, PERRLA, conjunctiva and sclera clear, Neck supple, No JVD, moist mucosa  CHEST/LUNG: Diminished breath sounds at B/L bases  BACK: No spinal tenderness  HEART: Regular rate and rhythm; 1/6 systolic murmur LUSB. Trace LE edema B/L  ABDOMEN: Gravid. +BS  EXTREMITIES:  No erythema or unilateral swelling of LE. No pain to palpation of calves.   PSYCH: Nl behavior, nl affect  NEUROLOGY: AAOx3, non-focal, cranial nerves intact  SKIN: Normal color, No rashes or lesions  LINES: PIV    Cardiovascular Diagnostic Testing:    ECG: Personally reviewed [X} NSR. incomplete RBBB                                                         Echo: Personally reviewed: < from: Echocardiogram, Pediatric (18 @ 09:16) >  Summary:   1. Normal fetal echocardiogram.   2. Fetal heart rhythm during exam: normal fetal rhythm. Brief short episodes of sinus bradycardia during study (mother said she did not eat anything for breakfast).   3. The mechanical [place] interval is normal at 116 msec.   4. Findings of the fetal study were discussed with the patient. Intrauterine course and post kayla course of the above cardiac findings were also discussed.   5. Mother going to obstetrician tomorrow.    < end of copied text >      Stress Testing:    Cath:    Imaging:    CXR: Personally reviewed    Labs: Personally reviewed                        12.9   12.05 )-----------( 234      ( 11 May 2018 12:10 )             40.5         138  |  100  |  6<L>  ----------------------------<  98  3.9   |  26  |  0.63    Ca    9.1      11 May 2018 12:10    TPro  7.1  /  Alb  3.7  /  TBili  0.3  /  DBili  x   /  AST  16  /  ALT  13  /  AlkPhos  113  05-    PT/INR - ( 11 May 2018 12:10 )   PT: 10.8 SEC;   INR: 0.97          PTT - ( 11 May 2018 12:10 )  PTT:28.3 SEC  CARDIAC MARKERS ( 11 May 2018 12:10 )  x     / < 0.06 ng/mL / 40 u/L / x     / x Wero Adler MD  PGY-1 | Preliminary Resident  Department of Internal Medicine  [phone] ([facility]) | [phone] ([facility])    Cardiology: Chicho ([facility])  CT surgery: Brenda ([facility])  OB: Nioklas  PMD: Stalek    Chief Complaint: L chest pain, dyspnea    HPI: 36 y/o F  currently 30 wks pregnant (c/b gestation DM not on meds), PMHx significant for unspecified open heart procedure (patient states she was born with a "heart murmur" and states her issue involved "Muscle growth" in R heart) in  at [facility] (Dr. Gutierrez) who p/w intermittent L sided chest pain and dyspnea x 3 days. Patient states she has been experiencing brief, seconds-long episodes of L sided chest pain characterized as "pressure." She denies radiation to the jaw or arms. No provocating or palliating factors noted. Pain is not worsened with breathing or movement. Patient states she was in the car for about 2 hours this weekend during a trip to [place], but denies any longer trips recently. She denies any sensation of palpitations, orthopnea, or PND; however, she has been having occasional difficulty sleeping and her  notes she breathes deeply at times when she has trouble falling asleep. Patient also says she "feels underwater" when these episodes of chest pain occur. Patient notes having similar symptoms in December when she was ~9 weeks pregnant, got worked up and was reassured her symptoms were not cardiac in origin.     PMHx:   Congenital abnormality of right atrium      PSHx:   History of open heart surgery  Appendectomy 2009      Allergies:  No Known Allergies      Medications:   Pre-kayla vitamins    FAMILY HISTORY:  CAD, father s/p angioplasty  HTN, T2DM, mother    Social History: Just took leave from work. Works as a  in [place]. Lives with  at home.  Smoking History: Denies  Alcohol Use: Denies  Drug Use: Denies    Review of Systems:  REVIEW OF SYSTEMS:  HEENT: +Nasal congestion  CARDIOVASCULAR: Per HPI  All other review of systems is negative unless indicated above.    Physical Exam:  T(F): 97.9 (05-11), Max: 97.9 (05-11)  HR: 93 (05-11) (93 - 93)  BP: 118/84 (05-11) (118/84 - 118/84)  RR: 16 (-)  SpO2: 100% (-)  GENERAL: No acute distress, well-developed  HEAD:  Atraumatic, Normocephalic  ENT: EOMI, PERRLA, conjunctiva and sclera clear, Neck supple, No JVD, moist mucosa  CHEST/LUNG: Diminished breath sounds at B/L bases  BACK: No spinal tenderness  HEART: Regular rate and rhythm; 1/6 systolic murmur LUSB. Trace LE edema B/L  ABDOMEN: Gravid. +BS  EXTREMITIES:  No erythema or unilateral swelling of LE. No pain to palpation of calves.   PSYCH: Nl behavior, nl affect  NEUROLOGY: AAOx3, non-focal, cranial nerves intact  SKIN: Normal color, No rashes or lesions  LINES: PIV    Cardiovascular Diagnostic Testing:    ECG: Personally reviewed [X} NSR. incomplete RBBB                                                         Echo: Personally reviewed: < from: Echocardiogram, Pediatric (18 @ 09:16) >  Summary:   1. Normal fetal echocardiogram.   2. Fetal heart rhythm during exam: normal fetal rhythm. Brief short episodes of sinus bradycardia during study (mother said she did not eat anything for breakfast).   3. The mechanical RI interval is normal at 116 msec.   4. Findings of the fetal study were discussed with the patient. Intrauterine course and post kayla course of the above cardiac findings were also discussed.   5. Mother going to obstetrician tomorrow.    < end of copied text >      Stress Testing:    Cath:    Imaging:    CXR: Personally reviewed    Labs: Personally reviewed                        12.9   12.05 )-----------( 234      ( 11 May 2018 12:10 )             40.5     05-11    138  |  100  |  6<L>  ----------------------------<  98  3.9   |  26  |  0.63    Ca    9.1      11 May 2018 12:10    TPro  7.1  /  Alb  3.7  /  TBili  0.3  /  DBili  x   /  AST  16  /  ALT  13  /  AlkPhos  113  05-11    PT/INR - ( 11 May 2018 12:10 )   PT: 10.8 SEC;   INR: 0.97          PTT - ( 11 May 2018 12:10 )  PTT:28.3 SEC  CARDIAC MARKERS ( 11 May 2018 12:10 )  x     / < 0.06 ng/mL / 40 u/L / x     / x Wero Adler MD  PGY-1 | Preliminary Resident  Department of Internal Medicine  [phone] ([facility]) | [phone] ([facility])    Cardiology: Chicho ([facility])  CT surgery: Brenda ([facility])  OB: Nikolas  PMD: Stalek    Chief Complaint: L chest pain, dyspnea    HPI: 36 y/o F  currently 30 wks pregnant (c/b gestation DM not on meds), PMHx significant for unspecified open heart procedure (patient states she was born with a "heart murmur" and states her issue involved "Muscle growth" in R heart, so possibly HOCM) in  at [facility] (Dr. Gutierrez) who p/w intermittent L sided chest pain and dyspnea x 3 days. Patient states she has been experiencing brief, seconds-long episodes of L sided chest pain characterized as "pressure." She denies radiation to the jaw or arms. No provocating or palliating factors noted. Pain is not worsened with breathing or movement. Patient states she was in the car for about 2 hours this weekend during a trip to [place], but denies any longer trips recently. She denies any sensation of palpitations, orthopnea, or PND; however, she has been having occasional difficulty sleeping and her  notes she breathes deeply at times when she has trouble falling asleep. Patient also says she "feels underwater" when these episodes of chest pain occur. Patient notes having similar symptoms in December when she was ~9 weeks pregnant, got worked up and was reassured her symptoms were not cardiac in origin.     PMHx:   Congenital abnormality of right atrium      PSHx:   History of open heart surgery  Appendectomy 2009      Allergies:  No Known Allergies      Medications:   Pre-kayla vitamins    FAMILY HISTORY:  CAD, father s/p angioplasty  HTN, T2DM, mother    Social History: Just took leave from work. Works as a  in [place]. Lives with  at home.  Smoking History: Denies  Alcohol Use: Denies  Drug Use: Denies    Review of Systems:  REVIEW OF SYSTEMS:  HEENT: +Nasal congestion  CARDIOVASCULAR: Per HPI  All other review of systems is negative unless indicated above.    Physical Exam:  T(F): 97.9 (05-11), Max: 97.9 (05-11)  HR: 93 (05-11) (93 - 93)  BP: 118/84 (05-11) (118/84 - 118/84)  RR: 16 (-)  SpO2: 100% (-)  GENERAL: No acute distress, well-developed  HEAD:  Atraumatic, Normocephalic  ENT: EOMI, PERRLA, conjunctiva and sclera clear, Neck supple, No JVD, moist mucosa  CHEST/LUNG: Diminished breath sounds at B/L bases  BACK: No spinal tenderness  HEART: Regular rate and rhythm; 1/6 systolic murmur LUSB. Trace LE edema B/L  ABDOMEN: Gravid. +BS  EXTREMITIES:  No erythema or unilateral swelling of LE. No pain to palpation of calves.   PSYCH: Nl behavior, nl affect  NEUROLOGY: AAOx3, non-focal, cranial nerves intact  SKIN: Normal color, No rashes or lesions  LINES: PIV    Cardiovascular Diagnostic Testing:    ECG: Personally reviewed [X} NSR. incomplete RBBB                                                         Echo: Personally reviewed: < from: Echocardiogram, Pediatric (18 @ 09:16) >  Summary:   1. Normal fetal echocardiogram.   2. Fetal heart rhythm during exam: normal fetal rhythm. Brief short episodes of sinus bradycardia during study (mother said she did not eat anything for breakfast).   3. The mechanical OR interval is normal at 116 msec.   4. Findings of the fetal study were discussed with the patient. Intrauterine course and post  course of the above cardiac findings were also discussed.   5. Mother going to obstetrician tomorrow.    < end of copied text >      Stress Testing:    Cath:    Imaging:    CXR: Personally reviewed    Labs: Personally reviewed                        12.9   12.05 )-----------( 234      ( 11 May 2018 12:10 )             40.5     -    138  |  100  |  6<L>  ----------------------------<  98  3.9   |  26  |  0.63    Ca    9.1      11 May 2018 12:10    TPro  7.1  /  Alb  3.7  /  TBili  0.3  /  DBili  x   /  AST  16  /  ALT  13  /  AlkPhos  113  05-    PT/INR - ( 11 May 2018 12:10 )   PT: 10.8 SEC;   INR: 0.97          PTT - ( 11 May 2018 12:10 )  PTT:28.3 SEC  CARDIAC MARKERS ( 11 May 2018 12:10 )  x     / < 0.06 ng/mL / 40 u/L / x     / x

## 2018-05-11 NOTE — CONSULT NOTE ADULT - PROBLEM SELECTOR RECOMMENDATION 9
- appreciate cards consult  - f/u echo  - NST pending. No fetal concerns, no OB complaints at this time.

## 2018-05-11 NOTE — ED PROVIDER NOTE - ATTENDING CONTRIBUTION TO CARE
37F h/o congenital heart disease s/p surgery currently pregnant  at 30wks presents with chest pain.  States the pain has been intermittent for the last three days, L sided lasting seconds.  Not associated w exertion, not pleuritic. No associated SOB, nausea or lightheaded. States during her first pregnancy, she had to have a termination because it was “too stressful” on her heart, then had heart surgery (? for HOCM). Afterwards had a normal pregnancy.  No abd pain, no vaginal bleeding. On exam well appearing, nad, lungs clear, rrr no murmur, abd gravid, nontender, 2+ pulses, no edema, no rash, speech clear, no focal neuro deficits. EKG no acute ischemia. Cards c/s, agree w ECHO.  Symptoms not c/w PE. Seen by OB, fetal monitoring normal.

## 2018-05-11 NOTE — ED PROVIDER NOTE - PROGRESS NOTE DETAILS
TTE shows minimal MR, overall preserved EF. Reviewed Cardiology note- since no evidence of severe MR and preserved EF, will discharge to home.

## 2018-05-31 ENCOUNTER — TRANSCRIPTION ENCOUNTER (OUTPATIENT)
Age: 38
End: 2018-05-31

## 2018-07-12 ENCOUNTER — OUTPATIENT (OUTPATIENT)
Dept: OUTPATIENT SERVICES | Facility: HOSPITAL | Age: 38
LOS: 1 days | End: 2018-07-12
Payer: COMMERCIAL

## 2018-07-12 DIAGNOSIS — O99.810 ABNORMAL GLUCOSE COMPLICATING PREGNANCY: ICD-10-CM

## 2018-07-12 DIAGNOSIS — Z98.890 OTHER SPECIFIED POSTPROCEDURAL STATES: Chronic | ICD-10-CM

## 2018-07-12 LAB
APPEARANCE UR: SIGNIFICANT CHANGE UP
BACTERIA # UR AUTO: SIGNIFICANT CHANGE UP
BILIRUB UR-MCNC: NEGATIVE — SIGNIFICANT CHANGE UP
BLD GP AB SCN SERPL QL: NEGATIVE — SIGNIFICANT CHANGE UP
BLOOD UR QL VISUAL: NEGATIVE — SIGNIFICANT CHANGE UP
BUN SERPL-MCNC: 10 MG/DL — SIGNIFICANT CHANGE UP (ref 7–23)
CALCIUM SERPL-MCNC: 8.9 MG/DL — SIGNIFICANT CHANGE UP (ref 8.4–10.5)
CHLORIDE SERPL-SCNC: 101 MMOL/L — SIGNIFICANT CHANGE UP (ref 98–107)
CO2 SERPL-SCNC: 24 MMOL/L — SIGNIFICANT CHANGE UP (ref 22–31)
COLOR SPEC: SIGNIFICANT CHANGE UP
CREAT SERPL-MCNC: 0.68 MG/DL — SIGNIFICANT CHANGE UP (ref 0.5–1.3)
GLUCOSE SERPL-MCNC: 124 MG/DL — HIGH (ref 70–99)
GLUCOSE UR-MCNC: NEGATIVE — SIGNIFICANT CHANGE UP
HCT VFR BLD CALC: 38.3 % — SIGNIFICANT CHANGE UP (ref 34.5–45)
HGB BLD-MCNC: 12.6 G/DL — SIGNIFICANT CHANGE UP (ref 11.5–15.5)
KETONES UR-MCNC: NEGATIVE — SIGNIFICANT CHANGE UP
LEUKOCYTE ESTERASE UR-ACNC: HIGH
MCHC RBC-ENTMCNC: 27.9 PG — SIGNIFICANT CHANGE UP (ref 27–34)
MCHC RBC-ENTMCNC: 32.9 % — SIGNIFICANT CHANGE UP (ref 32–36)
MCV RBC AUTO: 84.9 FL — SIGNIFICANT CHANGE UP (ref 80–100)
MUCOUS THREADS # UR AUTO: SIGNIFICANT CHANGE UP
NITRITE UR-MCNC: NEGATIVE — SIGNIFICANT CHANGE UP
NRBC # FLD: 0 — SIGNIFICANT CHANGE UP
PH UR: 6 — SIGNIFICANT CHANGE UP (ref 4.6–8)
PLATELET # BLD AUTO: 221 K/UL — SIGNIFICANT CHANGE UP (ref 150–400)
PMV BLD: 13 FL — SIGNIFICANT CHANGE UP (ref 7–13)
POTASSIUM SERPL-MCNC: 4 MMOL/L — SIGNIFICANT CHANGE UP (ref 3.5–5.3)
POTASSIUM SERPL-SCNC: 4 MMOL/L — SIGNIFICANT CHANGE UP (ref 3.5–5.3)
PROT UR-MCNC: NEGATIVE MG/DL — SIGNIFICANT CHANGE UP
RBC # BLD: 4.51 M/UL — SIGNIFICANT CHANGE UP (ref 3.8–5.2)
RBC # FLD: 13.3 % — SIGNIFICANT CHANGE UP (ref 10.3–14.5)
RBC CASTS # UR COMP ASSIST: SIGNIFICANT CHANGE UP (ref 0–?)
RH IG SCN BLD-IMP: POSITIVE — SIGNIFICANT CHANGE UP
SODIUM SERPL-SCNC: 137 MMOL/L — SIGNIFICANT CHANGE UP (ref 135–145)
SP GR SPEC: 1.01 — SIGNIFICANT CHANGE UP (ref 1–1.04)
SQUAMOUS # UR AUTO: SIGNIFICANT CHANGE UP
UROBILINOGEN FLD QL: NORMAL MG/DL — SIGNIFICANT CHANGE UP
WBC # BLD: 10.88 K/UL — HIGH (ref 3.8–10.5)
WBC # FLD AUTO: 10.88 K/UL — HIGH (ref 3.8–10.5)
WBC UR QL: SIGNIFICANT CHANGE UP (ref 0–?)

## 2018-07-12 PROCEDURE — 93010 ELECTROCARDIOGRAM REPORT: CPT

## 2018-07-13 ENCOUNTER — INPATIENT (INPATIENT)
Facility: HOSPITAL | Age: 38
LOS: 3 days | Discharge: HOME | End: 2018-07-17
Attending: OBSTETRICS & GYNECOLOGY | Admitting: OBSTETRICS & GYNECOLOGY
Payer: COMMERCIAL

## 2018-07-13 DIAGNOSIS — O99.810 ABNORMAL GLUCOSE COMPLICATING PREGNANCY: ICD-10-CM

## 2018-07-13 DIAGNOSIS — Z98.890 OTHER SPECIFIED POSTPROCEDURAL STATES: Chronic | ICD-10-CM

## 2018-07-13 LAB — T PALLIDUM AB TITR SER: NEGATIVE — SIGNIFICANT CHANGE UP

## 2018-07-14 VITALS — WEIGHT: 180.78 LBS | HEIGHT: 62 IN

## 2018-07-14 LAB
BASOPHILS # BLD AUTO: 0.02 K/UL — SIGNIFICANT CHANGE UP (ref 0–0.2)
BASOPHILS NFR BLD AUTO: 0.2 % — SIGNIFICANT CHANGE UP (ref 0–2)
BLD GP AB SCN SERPL QL: NEGATIVE — SIGNIFICANT CHANGE UP
EOSINOPHIL # BLD AUTO: 0.21 K/UL — SIGNIFICANT CHANGE UP (ref 0–0.5)
EOSINOPHIL NFR BLD AUTO: 2 % — SIGNIFICANT CHANGE UP (ref 0–6)
HBV SURFACE AG SER-ACNC: NEGATIVE — SIGNIFICANT CHANGE UP
HCT VFR BLD CALC: 37.8 % — SIGNIFICANT CHANGE UP (ref 34.5–45)
HGB BLD-MCNC: 12.3 G/DL — SIGNIFICANT CHANGE UP (ref 11.5–15.5)
IMM GRANULOCYTES # BLD AUTO: 0.06 # — SIGNIFICANT CHANGE UP
IMM GRANULOCYTES NFR BLD AUTO: 0.6 % — SIGNIFICANT CHANGE UP (ref 0–1.5)
LYMPHOCYTES # BLD AUTO: 2.15 K/UL — SIGNIFICANT CHANGE UP (ref 1–3.3)
LYMPHOCYTES # BLD AUTO: 20 % — SIGNIFICANT CHANGE UP (ref 13–44)
MCHC RBC-ENTMCNC: 28.7 PG — SIGNIFICANT CHANGE UP (ref 27–34)
MCHC RBC-ENTMCNC: 32.5 % — SIGNIFICANT CHANGE UP (ref 32–36)
MCV RBC AUTO: 88.3 FL — SIGNIFICANT CHANGE UP (ref 80–100)
MONOCYTES # BLD AUTO: 0.6 K/UL — SIGNIFICANT CHANGE UP (ref 0–0.9)
MONOCYTES NFR BLD AUTO: 5.6 % — SIGNIFICANT CHANGE UP (ref 2–14)
NEUTROPHILS # BLD AUTO: 7.71 K/UL — HIGH (ref 1.8–7.4)
NEUTROPHILS NFR BLD AUTO: 71.6 % — SIGNIFICANT CHANGE UP (ref 43–77)
NRBC # FLD: 0 — SIGNIFICANT CHANGE UP
PLATELET # BLD AUTO: 214 K/UL — SIGNIFICANT CHANGE UP (ref 150–400)
PMV BLD: 13.2 FL — HIGH (ref 7–13)
RBC # BLD: 4.28 M/UL — SIGNIFICANT CHANGE UP (ref 3.8–5.2)
RBC # FLD: 13.5 % — SIGNIFICANT CHANGE UP (ref 10.3–14.5)
RH IG SCN BLD-IMP: POSITIVE — SIGNIFICANT CHANGE UP
WBC # BLD: 10.75 K/UL — HIGH (ref 3.8–10.5)
WBC # FLD AUTO: 10.75 K/UL — HIGH (ref 3.8–10.5)

## 2018-07-14 PROCEDURE — 93010 ELECTROCARDIOGRAM REPORT: CPT

## 2018-07-14 RX ORDER — MORPHINE SULFATE 50 MG/1
4 CAPSULE, EXTENDED RELEASE ORAL ONCE
Qty: 0 | Refills: 0 | Status: DISCONTINUED | OUTPATIENT
Start: 2018-07-14 | End: 2018-07-14

## 2018-07-14 RX ORDER — SODIUM CHLORIDE 9 MG/ML
1000 INJECTION, SOLUTION INTRAVENOUS
Qty: 0 | Refills: 0 | Status: DISCONTINUED | OUTPATIENT
Start: 2018-07-14 | End: 2018-07-15

## 2018-07-14 RX ORDER — SODIUM CHLORIDE 9 MG/ML
1000 INJECTION, SOLUTION INTRAVENOUS ONCE
Qty: 0 | Refills: 0 | Status: COMPLETED | OUTPATIENT
Start: 2018-07-14 | End: 2018-07-14

## 2018-07-14 RX ORDER — SODIUM CHLORIDE 9 MG/ML
1000 INJECTION, SOLUTION INTRAVENOUS
Qty: 0 | Refills: 0 | Status: DISCONTINUED | OUTPATIENT
Start: 2018-07-14 | End: 2018-07-14

## 2018-07-14 RX ORDER — OXYTOCIN 10 UNIT/ML
333.33 VIAL (ML) INJECTION
Qty: 20 | Refills: 0 | Status: DISCONTINUED | OUTPATIENT
Start: 2018-07-14 | End: 2018-07-15

## 2018-07-14 RX ORDER — CITRIC ACID/SODIUM CITRATE 300-500 MG
15 SOLUTION, ORAL ORAL EVERY 4 HOURS
Qty: 0 | Refills: 0 | Status: DISCONTINUED | OUTPATIENT
Start: 2018-07-14 | End: 2018-07-15

## 2018-07-14 RX ADMIN — MORPHINE SULFATE 4 MILLIGRAM(S): 50 CAPSULE, EXTENDED RELEASE ORAL at 20:24

## 2018-07-14 RX ADMIN — MORPHINE SULFATE 4 MILLIGRAM(S): 50 CAPSULE, EXTENDED RELEASE ORAL at 23:23

## 2018-07-14 RX ADMIN — SODIUM CHLORIDE 125 MILLILITER(S): 9 INJECTION, SOLUTION INTRAVENOUS at 11:11

## 2018-07-14 RX ADMIN — MORPHINE SULFATE 4 MILLIGRAM(S): 50 CAPSULE, EXTENDED RELEASE ORAL at 20:23

## 2018-07-14 NOTE — CONSULT NOTE ADULT - SUBJECTIVE AND OBJECTIVE BOX
HPI:    38 yo woman currently 36 wks pregnant, hx of HOCM s/p myectomy 2011 who presented for elective vaginal delivery.  Patient has had no issues w/ the pregnancy course.  She was seen by her outpatient cardiologist 1 month ago (Dr. Valentino) who stated that it was safe for patient to undergo vaginal delivery.  Cardiology consulted for fluid management recommendations sara-procedurally.      Seen by our service on 5/2018 at the ED for dyspnea, which was deemed due to anxiety.  TTE performed w/ no evidence of valvulopathy, preserved EF.      PMH:   Congenital abnormality of right atrium      PSH:   History of open heart surgery  No significant past surgical history      Medications:   citric acid/sodium citrate Solution 15 milliLiter(s) Oral every 4 hours  dextrose 5% + sodium chloride 0.9%. 1000 milliLiter(s) IV Continuous <Continuous>  lactated ringers Bolus 1000 milliLiter(s) IV Bolus once  lactated ringers. 1000 milliLiter(s) IV Continuous <Continuous>  oxytocin Infusion 333.333 milliUNIT(s)/Min IV Continuous <Continuous>      Allergies:  No Known Allergies      FAMILY HISTORY:  No pertinent family history in first degree relatives      Social History:  Smoking History:  Alcohol Use:  Drug Use:    Review of Systems:  REVIEW OF SYSTEMS:  CONSTITUTIONAL: No weakness, fevers or chills  EYES/ENT: No visual changes;  No dysphagia  NECK: No pain or stiffness  RESPIRATORY: No cough, wheezing, hemoptysis; No shortness of breath  CARDIOVASCULAR: No chest pain or palpitations; No lower extremity edema  GASTROINTESTINAL: No abdominal or epigastric pain. No nausea, vomiting, or hematemesis; No diarrhea or constipation. No melena or hematochezia.  BACK: No back pain  GENITOURINARY: No dysuria, frequency or hematuria  NEUROLOGICAL: No numbness or weakness  SKIN: No itching, burning, rashes, or lesions   All other review of systems is negative unless indicated above.    Physical Exam:    GENERAL: No acute distress, well-developed  HEAD:  Atraumatic, Normocephalic  ENT: EOMI, PERRLA, conjunctiva and sclera clear, Neck supple, No JVD, moist mucosa  CHEST/LUNG: Clear to auscultation bilaterally; No wheeze, equal breath sounds bilaterally   BACK: No spinal tenderness  HEART: Regular rate and rhythm; No murmurs, rubs, or gallops  ABDOMEN: Soft, Nontender, Nondistended; Bowel sounds present  EXTREMITIES:  No clubbing, cyanosis, or edema  PSYCH: Nl behavior, nl affect  NEUROLOGY: AAOx3, non-focal, cranial nerves intact  SKIN: Normal color, No rashes or lesions  LINES:    Cardiovascular Diagnostic Testing:    ECG: sinus rhythm, normal axis, incomplete RBBB, no acute ischemic changes     Echo:    < from: Transthoracic Echocardiogram (05.11.18 @ 17:06) >  Technically very difficult study with non-diagnostic apical  views.  Unable to administer intravenous contrast due to  medical contraindications.  1. Normal mitral valve. Minimal mitral regurgitation.  2. Aortic valve leaflet morphology not well visualized.  Mild aortic regurgitation.  3. Normal left ventricular internal dimensions and wall  thicknesses.  4. Limited views from the parasternal window suggest  grossly preserved overall left ventricular systolic  function.  Unable to exclude segmental wall motion  abnormalities.  5. Unable to accurately evaluate right ventricular size or  systolic function.  6. Estimated right ventricular systolic pressure equals 37  mm Hg, assuming right atrial pressure equals 10 mm Hg,  consistent with borderline pulmonary hypertension.    < end of copied text >      Imaging:    Labs: Personally reviewed                        12.3   10.75 )-----------( 214      ( 14 Jul 2018 07:15 )             37.8     07-12    137  |  101  |  10  ----------------------------<  124<H>  4.0   |  24  |  0.68    Ca    8.9      12 Jul 2018 14:20    Assessment     38 yo woman currently 36 wks pregnant, hx of HOCM s/p myectomy 2011 who presented for elective vaginal delivery.    Pt euvolemic w/ normal ECG  TTE from 5/2018 w/ preserved LV function, no MR, mild elevated PA pressures, but overall well compensated     RECS  - no cardiac objections to undergo vaginal delivery   - ok to give IVF sara-procedurally   - if any clinical signs of ADHF (dyspnea, orthopnea, leg swelling); ok to give IV lasix PRN; unlikely that she will need this      Will follow post-delivery     A Mack HESTER   [facility]

## 2018-07-15 ENCOUNTER — RESULT REVIEW (OUTPATIENT)
Age: 38
End: 2018-07-15

## 2018-07-15 LAB
RUBV IGG SER-ACNC: 14.2 INDEX — SIGNIFICANT CHANGE UP
RUBV IGG SER-IMP: POSITIVE — SIGNIFICANT CHANGE UP
T PALLIDUM AB TITR SER: NEGATIVE — SIGNIFICANT CHANGE UP

## 2018-07-15 PROCEDURE — 99222 1ST HOSP IP/OBS MODERATE 55: CPT

## 2018-07-15 PROCEDURE — 88307 TISSUE EXAM BY PATHOLOGIST: CPT | Mod: 26

## 2018-07-15 RX ORDER — AER TRAVELER 0.5 G/1
1 SOLUTION RECTAL; TOPICAL EVERY 4 HOURS
Qty: 0 | Refills: 0 | Status: DISCONTINUED | OUTPATIENT
Start: 2018-07-15 | End: 2018-07-15

## 2018-07-15 RX ORDER — PRAMOXINE HYDROCHLORIDE 150 MG/15G
1 AEROSOL, FOAM RECTAL EVERY 4 HOURS
Qty: 0 | Refills: 0 | Status: DISCONTINUED | OUTPATIENT
Start: 2018-07-15 | End: 2018-07-16

## 2018-07-15 RX ORDER — ACETAMINOPHEN 500 MG
975 TABLET ORAL EVERY 6 HOURS
Qty: 0 | Refills: 0 | Status: DISCONTINUED | OUTPATIENT
Start: 2018-07-15 | End: 2018-07-17

## 2018-07-15 RX ORDER — OXYTOCIN 10 UNIT/ML
333.33 VIAL (ML) INJECTION
Qty: 20 | Refills: 0 | Status: DISCONTINUED | OUTPATIENT
Start: 2018-07-15 | End: 2018-07-15

## 2018-07-15 RX ORDER — OXYTOCIN 10 UNIT/ML
333.33 VIAL (ML) INJECTION
Qty: 20 | Refills: 0 | Status: COMPLETED | OUTPATIENT
Start: 2018-07-15

## 2018-07-15 RX ORDER — DIBUCAINE 1 %
1 OINTMENT (GRAM) RECTAL EVERY 4 HOURS
Qty: 0 | Refills: 0 | Status: DISCONTINUED | OUTPATIENT
Start: 2018-07-15 | End: 2018-07-17

## 2018-07-15 RX ORDER — IBUPROFEN 200 MG
600 TABLET ORAL EVERY 6 HOURS
Qty: 0 | Refills: 0 | Status: DISCONTINUED | OUTPATIENT
Start: 2018-07-15 | End: 2018-07-17

## 2018-07-15 RX ORDER — PRAMOXINE HYDROCHLORIDE 150 MG/15G
1 AEROSOL, FOAM RECTAL EVERY 4 HOURS
Qty: 0 | Refills: 0 | Status: DISCONTINUED | OUTPATIENT
Start: 2018-07-15 | End: 2018-07-15

## 2018-07-15 RX ORDER — MAGNESIUM HYDROXIDE 400 MG/1
30 TABLET, CHEWABLE ORAL
Qty: 0 | Refills: 0 | Status: DISCONTINUED | OUTPATIENT
Start: 2018-07-15 | End: 2018-07-17

## 2018-07-15 RX ORDER — KETOROLAC TROMETHAMINE 30 MG/ML
30 SYRINGE (ML) INJECTION ONCE
Qty: 0 | Refills: 0 | Status: DISCONTINUED | OUTPATIENT
Start: 2018-07-15 | End: 2018-07-15

## 2018-07-15 RX ORDER — DIPHENHYDRAMINE HCL 50 MG
25 CAPSULE ORAL EVERY 6 HOURS
Qty: 0 | Refills: 0 | Status: DISCONTINUED | OUTPATIENT
Start: 2018-07-15 | End: 2018-07-17

## 2018-07-15 RX ORDER — IBUPROFEN 200 MG
600 TABLET ORAL EVERY 6 HOURS
Qty: 0 | Refills: 0 | Status: COMPLETED | OUTPATIENT
Start: 2018-07-15 | End: 2019-06-13

## 2018-07-15 RX ORDER — OXYCODONE HYDROCHLORIDE 5 MG/1
5 TABLET ORAL EVERY 4 HOURS
Qty: 0 | Refills: 0 | Status: DISCONTINUED | OUTPATIENT
Start: 2018-07-15 | End: 2018-07-17

## 2018-07-15 RX ORDER — GLYCERIN ADULT
1 SUPPOSITORY, RECTAL RECTAL AT BEDTIME
Qty: 0 | Refills: 0 | Status: DISCONTINUED | OUTPATIENT
Start: 2018-07-15 | End: 2018-07-17

## 2018-07-15 RX ORDER — HYDROCORTISONE 1 %
1 OINTMENT (GRAM) TOPICAL EVERY 4 HOURS
Qty: 0 | Refills: 0 | Status: DISCONTINUED | OUTPATIENT
Start: 2018-07-15 | End: 2018-07-16

## 2018-07-15 RX ORDER — OXYTOCIN 10 UNIT/ML
41.67 VIAL (ML) INJECTION
Qty: 20 | Refills: 0 | Status: DISCONTINUED | OUTPATIENT
Start: 2018-07-15 | End: 2018-07-15

## 2018-07-15 RX ORDER — DIBUCAINE 1 %
1 OINTMENT (GRAM) RECTAL EVERY 4 HOURS
Qty: 0 | Refills: 0 | Status: DISCONTINUED | OUTPATIENT
Start: 2018-07-15 | End: 2018-07-15

## 2018-07-15 RX ORDER — SODIUM CHLORIDE 9 MG/ML
3 INJECTION INTRAMUSCULAR; INTRAVENOUS; SUBCUTANEOUS EVERY 8 HOURS
Qty: 0 | Refills: 0 | Status: DISCONTINUED | OUTPATIENT
Start: 2018-07-15 | End: 2018-07-15

## 2018-07-15 RX ORDER — SODIUM CHLORIDE 9 MG/ML
3 INJECTION INTRAMUSCULAR; INTRAVENOUS; SUBCUTANEOUS EVERY 8 HOURS
Qty: 0 | Refills: 0 | Status: DISCONTINUED | OUTPATIENT
Start: 2018-07-15 | End: 2018-07-17

## 2018-07-15 RX ORDER — SIMETHICONE 80 MG/1
80 TABLET, CHEWABLE ORAL EVERY 6 HOURS
Qty: 0 | Refills: 0 | Status: DISCONTINUED | OUTPATIENT
Start: 2018-07-15 | End: 2018-07-17

## 2018-07-15 RX ORDER — ACETAMINOPHEN 500 MG
975 TABLET ORAL EVERY 6 HOURS
Qty: 0 | Refills: 0 | Status: COMPLETED | OUTPATIENT
Start: 2018-07-15 | End: 2019-06-13

## 2018-07-15 RX ORDER — OXYCODONE HYDROCHLORIDE 5 MG/1
5 TABLET ORAL
Qty: 0 | Refills: 0 | Status: DISCONTINUED | OUTPATIENT
Start: 2018-07-15 | End: 2018-07-17

## 2018-07-15 RX ORDER — HYDROCORTISONE 1 %
1 OINTMENT (GRAM) TOPICAL EVERY 4 HOURS
Qty: 0 | Refills: 0 | Status: DISCONTINUED | OUTPATIENT
Start: 2018-07-15 | End: 2018-07-15

## 2018-07-15 RX ORDER — LANOLIN
1 OINTMENT (GRAM) TOPICAL EVERY 6 HOURS
Qty: 0 | Refills: 0 | Status: DISCONTINUED | OUTPATIENT
Start: 2018-07-15 | End: 2018-07-17

## 2018-07-15 RX ORDER — DOCUSATE SODIUM 100 MG
100 CAPSULE ORAL
Qty: 0 | Refills: 0 | Status: DISCONTINUED | OUTPATIENT
Start: 2018-07-15 | End: 2018-07-17

## 2018-07-15 RX ORDER — TETANUS TOXOID, REDUCED DIPHTHERIA TOXOID AND ACELLULAR PERTUSSIS VACCINE, ADSORBED 5; 2.5; 8; 8; 2.5 [IU]/.5ML; [IU]/.5ML; UG/.5ML; UG/.5ML; UG/.5ML
0.5 SUSPENSION INTRAMUSCULAR ONCE
Qty: 0 | Refills: 0 | Status: DISCONTINUED | OUTPATIENT
Start: 2018-07-15 | End: 2018-07-17

## 2018-07-15 RX ORDER — AER TRAVELER 0.5 G/1
1 SOLUTION RECTAL; TOPICAL EVERY 4 HOURS
Qty: 0 | Refills: 0 | Status: DISCONTINUED | OUTPATIENT
Start: 2018-07-15 | End: 2018-07-17

## 2018-07-15 RX ADMIN — Medication 600 MILLIGRAM(S): at 23:50

## 2018-07-15 RX ADMIN — Medication 975 MILLIGRAM(S): at 11:00

## 2018-07-15 RX ADMIN — Medication 1000 MILLIUNIT(S)/MIN: at 06:20

## 2018-07-15 RX ADMIN — Medication 30 MILLIGRAM(S): at 09:07

## 2018-07-15 RX ADMIN — Medication 600 MILLIGRAM(S): at 17:21

## 2018-07-15 RX ADMIN — Medication 975 MILLIGRAM(S): at 10:34

## 2018-07-15 RX ADMIN — Medication 975 MILLIGRAM(S): at 23:50

## 2018-07-15 RX ADMIN — Medication 975 MILLIGRAM(S): at 17:20

## 2018-07-15 RX ADMIN — Medication 30 MILLIGRAM(S): at 09:37

## 2018-07-15 RX ADMIN — SODIUM CHLORIDE 3 MILLILITER(S): 9 INJECTION INTRAMUSCULAR; INTRAVENOUS; SUBCUTANEOUS at 16:40

## 2018-07-15 RX ADMIN — Medication 600 MILLIGRAM(S): at 17:50

## 2018-07-15 RX ADMIN — SODIUM CHLORIDE 3 MILLILITER(S): 9 INJECTION INTRAMUSCULAR; INTRAVENOUS; SUBCUTANEOUS at 23:50

## 2018-07-15 RX ADMIN — Medication 125 MILLIUNIT(S)/MIN: at 08:41

## 2018-07-15 RX ADMIN — Medication 975 MILLIGRAM(S): at 17:50

## 2018-07-16 RX ORDER — HYDROCORTISONE 1 %
1 OINTMENT (GRAM) TOPICAL EVERY 4 HOURS
Qty: 0 | Refills: 0 | Status: DISCONTINUED | OUTPATIENT
Start: 2018-07-16 | End: 2018-07-17

## 2018-07-16 RX ORDER — PRAMOXINE HYDROCHLORIDE 150 MG/15G
1 AEROSOL, FOAM RECTAL EVERY 4 HOURS
Qty: 0 | Refills: 0 | Status: DISCONTINUED | OUTPATIENT
Start: 2018-07-16 | End: 2018-07-17

## 2018-07-16 RX ADMIN — Medication 600 MILLIGRAM(S): at 18:23

## 2018-07-16 RX ADMIN — Medication 600 MILLIGRAM(S): at 07:00

## 2018-07-16 RX ADMIN — SODIUM CHLORIDE 3 MILLILITER(S): 9 INJECTION INTRAMUSCULAR; INTRAVENOUS; SUBCUTANEOUS at 23:00

## 2018-07-16 RX ADMIN — Medication 975 MILLIGRAM(S): at 00:30

## 2018-07-16 RX ADMIN — Medication 600 MILLIGRAM(S): at 00:30

## 2018-07-16 RX ADMIN — Medication 975 MILLIGRAM(S): at 17:53

## 2018-07-16 RX ADMIN — SODIUM CHLORIDE 3 MILLILITER(S): 9 INJECTION INTRAMUSCULAR; INTRAVENOUS; SUBCUTANEOUS at 06:20

## 2018-07-16 RX ADMIN — Medication 975 MILLIGRAM(S): at 06:20

## 2018-07-16 RX ADMIN — Medication 600 MILLIGRAM(S): at 06:20

## 2018-07-16 RX ADMIN — Medication 600 MILLIGRAM(S): at 17:53

## 2018-07-16 RX ADMIN — Medication 975 MILLIGRAM(S): at 07:00

## 2018-07-16 RX ADMIN — Medication 975 MILLIGRAM(S): at 12:10

## 2018-07-16 RX ADMIN — Medication 975 MILLIGRAM(S): at 11:40

## 2018-07-16 RX ADMIN — Medication 975 MILLIGRAM(S): at 18:23

## 2018-07-16 RX ADMIN — SODIUM CHLORIDE 3 MILLILITER(S): 9 INJECTION INTRAMUSCULAR; INTRAVENOUS; SUBCUTANEOUS at 17:12

## 2018-07-16 RX ADMIN — Medication 600 MILLIGRAM(S): at 12:10

## 2018-07-16 RX ADMIN — Medication 600 MILLIGRAM(S): at 11:40

## 2018-07-16 RX ADMIN — Medication 1 TABLET(S): at 11:40

## 2018-07-16 NOTE — PROGRESS NOTE ADULT - SUBJECTIVE AND OBJECTIVE BOX
OB Progress Note:  PPD#2    S: 38yo PPD#2 s/p . Patient feels well. Pain is well controlled. She is tolerating a regular diet and passing flatus. She is voiding spontaneously, and ambulating without difficulty. She is breastfeeding. Denies CP/SOB. Denies lightheadedness/dizziness. Denies N/V.    O:  Vitals:  Vital Signs Last 24 Hrs  T(C): 36.6 (2018 06:00), Max: 37.1 (15 Jul 2018 12:31)  T(F): 97.9 (2018 06:00), Max: 98.8 (15 Jul 2018 12:31)  HR: 80 (2018 06:00) (74 - 82)  BP: 106/61 (2018 06:00) (106/61 - 132/71)  BP(mean): --  RR: 18 (2018 06:00) (17 - 18)  SpO2: 100% (2018 06:00) (100% - 100%)    MEDICATIONS  (STANDING):  acetaminophen   Tablet. 975 milliGRAM(s) Oral every 6 hours  diphtheria/tetanus/pertussis (acellular) Vaccine (ADAcel) 0.5 milliLiter(s) IntraMuscular once  ibuprofen  Tablet 600 milliGRAM(s) Oral every 6 hours  oxyCODONE    IR 5 milliGRAM(s) Oral every 3 hours  prenatal multivitamin 1 Tablet(s) Oral daily  sodium chloride 0.9% lock flush 3 milliLiter(s) IV Push every 8 hours      Labs:  Blood type: B Positive  Rubella IgG: Positive ( @ 07:00)  RPR: Negative                          12.3   10.75<H> >-----------< 214    (  @ 07:15 )             37.8        Physical Exam:  General: NAD  Abdomen: soft, non-tender, non-distended, fundus firm  Vaginal: Lochia wnl  Extremities: No erythema/edema    A/P: 38yo PPD#2 s/p . H/o HOCM s/o myectomy in  and repair of RV and tricuspid valve. Admission EKG normal. Cleared by cardiology. Patient doing well postpartum.  - Pain well controlled, continue current pain regimen  - Increase ambulation, SCDs when not ambulating  - Continue regular diet  - Discharge Planning    Esrakua Rodríguez, PGY1

## 2018-07-16 NOTE — LACTATION INITIAL EVALUATION - LACTATION INTERVENTIONS
Infant fed recently.  Patient states prefers left side.  Left nipple longer.  Discussed ways to bring out right nipple.  Recommended football hold.  Encouraged to call for assistance when feeding./initiate skin to skin/initiate hand expression routine

## 2018-07-17 ENCOUNTER — TRANSCRIPTION ENCOUNTER (OUTPATIENT)
Age: 38
End: 2018-07-17

## 2018-07-17 VITALS
DIASTOLIC BLOOD PRESSURE: 82 MMHG | HEART RATE: 92 BPM | TEMPERATURE: 99 F | SYSTOLIC BLOOD PRESSURE: 136 MMHG | OXYGEN SATURATION: 100 % | RESPIRATION RATE: 18 BRPM

## 2018-07-17 RX ORDER — IBUPROFEN 200 MG
1 TABLET ORAL
Qty: 0 | Refills: 0 | COMMUNITY
Start: 2018-07-17

## 2018-07-17 RX ORDER — ACETAMINOPHEN 500 MG
3 TABLET ORAL
Qty: 0 | Refills: 0 | COMMUNITY
Start: 2018-07-17

## 2018-07-17 RX ADMIN — Medication 975 MILLIGRAM(S): at 01:24

## 2018-07-17 RX ADMIN — Medication 600 MILLIGRAM(S): at 11:04

## 2018-07-17 RX ADMIN — Medication 600 MILLIGRAM(S): at 01:55

## 2018-07-17 RX ADMIN — Medication 600 MILLIGRAM(S): at 11:37

## 2018-07-17 RX ADMIN — SODIUM CHLORIDE 3 MILLILITER(S): 9 INJECTION INTRAMUSCULAR; INTRAVENOUS; SUBCUTANEOUS at 06:00

## 2018-07-17 RX ADMIN — Medication 975 MILLIGRAM(S): at 01:55

## 2018-07-17 RX ADMIN — Medication 600 MILLIGRAM(S): at 01:24

## 2018-07-17 RX ADMIN — Medication 975 MILLIGRAM(S): at 11:07

## 2018-07-17 RX ADMIN — Medication 1 TABLET(S): at 11:06

## 2018-07-17 RX ADMIN — Medication 975 MILLIGRAM(S): at 11:37

## 2018-07-17 NOTE — DISCHARGE NOTE OB - PATIENT PORTAL LINK FT
You can access the [facility], offered by [facility], by registering with the following website: [email or web address]

## 2018-07-17 NOTE — DISCHARGE NOTE OB - CARE PLAN
Principal Discharge DX:	Vaginal delivery  Goal:	normal recovery  Assessment and plan of treatment:	discharge home on postpartum day 2  Secondary Diagnosis:	Gestational diabetes

## 2018-07-17 NOTE — PROGRESS NOTE ADULT - SUBJECTIVE AND OBJECTIVE BOX
S: Patient doing well post partum day 2.     Pain controlled.  +OOB.  +void.    Tolerating PO.  Lochia WNL.    O: Vital Signs Last 24 Hrs  T(C): 37.1 (2018 07:24), Max: 37.1 (2018 07:24)  T(F): 98.7 (2018 07:24), Max: 98.7 (2018 07:24)  HR: 92 (2018 07:24) (73 - 92)  BP: 136/82 (2018 07:24) (132/79 - 136/82)  BP(mean): --  RR: 18 (2018 07:24) (18 - 18)  SpO2: 100% (2018 07:24) (100% - 100%)      Pt without complaints  vital signs stable  Abdomen soft  fundus firm, non tender  extremities non tender  lochia wnl    Patient doing well  Routine post partum care    Labs:      ABO Interpretation: B ( @ 06:17)    Rh Interpretation: Positive ( @ 06:17)    Antibody Screen Negative      A: 37y s/p  doing well.

## 2018-07-17 NOTE — DISCHARGE NOTE OB - CARE PROVIDER_API CALL
Autumn Bell), Obstetrics and Gynecology  [address]  [address]  [place] [address]  Phone: [phone]  Fax: [phone]

## 2018-07-17 NOTE — PROGRESS NOTE ADULT - ASSESSMENT
-Continue routine postpartum care.  -Discharge planned for today  - Circumcision completed yesterday  -follow up with [facility] 6 weeks, will be tested for diabetes.    HARRIET Draper MD

## 2018-10-04 ENCOUNTER — TRANSCRIPTION ENCOUNTER (OUTPATIENT)
Age: 38
End: 2018-10-04

## 2018-10-13 ENCOUNTER — RESULT REVIEW (OUTPATIENT)
Age: 38
End: 2018-10-13

## 2018-12-04 ENCOUNTER — TRANSCRIPTION ENCOUNTER (OUTPATIENT)
Age: 38
End: 2018-12-04

## 2019-01-03 ENCOUNTER — TRANSCRIPTION ENCOUNTER (OUTPATIENT)
Age: 39
End: 2019-01-03

## 2019-02-20 ENCOUNTER — TRANSCRIPTION ENCOUNTER (OUTPATIENT)
Age: 39
End: 2019-02-20

## 2019-08-27 NOTE — ED ADULT NURSE NOTE - NS ED NURSE DISCH DISPOSITION
[facility]  Endocrinology  Diabetes Consult Note    Consult Requested by: Jake Alvarez MD   Reason for admit: Septic shock    HISTORY OF PRESENT ILLNESS:  Reason for Consult: Management of Steroid induced Hyperglycemia     Surgical Procedure and Date:   Bilateral re-transplant of Lung    Diabetes diagnosis year: No dx of dm. Steroid induced hyperglycemia secondary to lung transplant treatment.     Home Diabetes Medications:    Januvia 100 mg daily   Novolog 4 units TIDWM    How often checking glucose at home? Not checking  BG readings on regimen: Unknown  Hypoglycemia on the regimen?  No  Missed doses on regimen?  Yes    Diabetes Complications include:     Hyperglycemia    Complicating diabetes co morbidities:   Glucocorticoid use       HPI:   Patient is a 32 y.o. female with a diagnosis of   MsAditi Ibarra is a 33 yo female s/p bilateral re-transplant (6/18/19) who presented to the ED in acute respiratory distress 08/26/2019. Per patient's caregiver, patient was doing well since her hospital discharge, but reports fevers (tmax 101.5) x 2 days, cough with occasional thin sputum, and severe dyspnea requiring >5L oxygen over the weekend. Patient's caregiver also reports poor appetite and PO intake over the last week. Patient has previous history of steroid sensitivity during admission in regards to glycemic control. Endocrinology consulted to manage hyperglycemia during admssion.     Lab Results   Component Value Date    HGBA1C 4.7 01/09/2019       Interval HPI:   Overnight events:   Now admitted to SICU. Patient is intubated and sedated. Spoke with family at bedside. BG is trending downward on IV intensive insulin infusion.   Eating:   NPO  Nausea: No  Hypoglycemia and intervention: No  Fever: No  TPN and/or TF: No  If yes, type of TF/TPN and rate: None    PMH, PSH, FH, SH reviewed     ROS:  Unable to obtain due to: Sedation,Intubation,Altered mental status,Critical illness,Reviewed ROS from  note dated 08/26/2019 per Shama Canales PA-C      Current Medications and/or Treatments Impacting Glycemic Control  Immunotherapy:    Immunosuppressants     None        Steroids:   Hormones (From admission, onward)    Start     Stop Route Frequency Ordered    08/27/19 0900  predniSONE tablet 15 mg      -- OG Daily 08/26/19 1106    08/26/19 1031  vasopressin (PITRESSIN) 0.2 Units/mL in dextrose 5 % 100 mL infusion      -- IV Continuous 08/26/19 1031        Pressors:    Autonomic Drugs (From admission, onward)    Start     Stop Route Frequency Ordered    08/26/19 1145  norepinephrine 4 mg in dextrose 5% 250 mL infusion (premix) (titrating)     Question Answer Comment   Titrate by: (in mcg/kg/min) 0.05    Titrate interval: (in minutes) 5    Titrate to maintain: (MAP or SBP) MAP    Greater than: (in mmHg) 65    Maximum dose: (in mcg/kg/min) 3        -- IV Continuous 08/26/19 1042        Hyperglycemia/Diabetes Medications:   Antihyperglycemics (From admission, onward)    Start     Stop Route Frequency Ordered    08/26/19 1549  insulin regular (Humulin R) 100 Units in sodium chloride 0.9% 100 mL infusion     Question:  Insulin Rate Adjustment (DO NOT MODIFY ANSWER)  Answer:  [email or web address]    -- IV Continuous 08/26/19 1549    08/26/19 1515  insulin aspart U-100 (NovoLOG) 100 unit/mL (3 mL) pen     Note to Pharmacy:  Created by cabinet override    08/27 0329   08/26/19 1515             PHYSICAL EXAMINATION:  Vitals:    08/26/19 1934   BP: 101/65   Pulse: 110   Resp: (!) 9   Temp:      Body mass index is 22.6 kg/m².    Physical Exam   Constitutional: Well developed, well nourished, NAD. Sedated.  ENT: External ears no masses with nose patent  Neck: Supple; trachea midline; no thyromegaly.  Cardiovascular: heart sounds present with no LE edema.   Lungs: lungs anterior bilaterally clear to auscultation. Intubated on ventilator.  Abdomen: Soft, no masses, no  hernias.  MS: No clubbing or cyanosis of nails noted unable to assess gait.  Skin: No rashes, lesions, or ulcers; no nodules.  Foot: nails in good condition, no amputations noted.        Labs Reviewed and Include   Recent Labs   Lab 08/26/19  1804   *   CALCIUM 8.1*   ALBUMIN 2.2*   PROT 6.2   *   K 5.3*   CO2 17*      BUN 37*   CREATININE 2.8*   ALKPHOS 146*   ALT 11   AST 19   BILITOT 0.7     Lab Results   Component Value Date    WBC 8.27 08/26/2019    HGB 9.0 (L) 08/26/2019    HCT 30.4 (L) 08/26/2019     (H) 08/26/2019     (L) 08/26/2019     Recent Labs   Lab 08/26/19  0847   TSH 1.120     Lab Results   Component Value Date    HGBA1C 4.7 01/09/2019       Nutritional status:   Body mass index is 22.6 kg/m².  Lab Results   Component Value Date    ALBUMIN 2.2 (L) 08/26/2019    ALBUMIN 2.3 (L) 08/26/2019    ALBUMIN 2.6 (L) 08/26/2019     Lab Results   Component Value Date    PREALBUMIN 15 (L) 05/29/2014    PREALBUMIN 11 (L) 05/09/2014    PREALBUMIN 18 (L) 03/19/2014       Estimated Creatinine Clearance: 21.1 mL/min (A) (based on SCr of 2.8 mg/dL (H)).    Accu-Checks  Recent Labs     08/26/19  1434 08/26/19  1716 08/26/19  1802 08/26/19  1802 08/26/19  1924 08/26/19  2028   POCTGLUCOSE 399* 390* 404* 372* 302* 298*        ASSESSMENT and PLAN    * Septic shock  Infection may elevate BG readings  Managed per primary team  Avoid hypoglycemia        Acute hyperglycemia  BG goal 140 - 180     Continue IV insulin infusion protocol  Requires intensive BG monitoring while on protocol     Discharge planning: TBD        LISBETH (acute kidney injury)  Caution with insulin stacking            Plan discussed with patient, family, and RN at bedside.     Montrell Guardado, KUMAR  Endocrinology  [facility]   Discharged

## 2019-12-13 ENCOUNTER — RESULT REVIEW (OUTPATIENT)
Age: 39
End: 2019-12-13

## 2020-01-02 ENCOUNTER — APPOINTMENT (OUTPATIENT)
Dept: DERMATOLOGY | Facility: CLINIC | Age: 40
End: 2020-01-02
Payer: COMMERCIAL

## 2020-01-02 VITALS — HEIGHT: 63 IN | BODY MASS INDEX: 30.48 KG/M2 | WEIGHT: 172 LBS

## 2020-01-02 DIAGNOSIS — Z87.2 PERSONAL HISTORY OF DISEASES OF THE SKIN AND SUBCUTANEOUS TISSUE: ICD-10-CM

## 2020-01-02 DIAGNOSIS — L24.9 IRRITANT CONTACT DERMATITIS, UNSPECIFIED CAUSE: ICD-10-CM

## 2020-01-02 DIAGNOSIS — Z78.9 OTHER SPECIFIED HEALTH STATUS: ICD-10-CM

## 2020-01-02 PROCEDURE — 99203 OFFICE O/P NEW LOW 30 MIN: CPT

## 2020-02-05 ENCOUNTER — APPOINTMENT (OUTPATIENT)
Dept: INTERNAL MEDICINE | Facility: CLINIC | Age: 40
End: 2020-02-05
Payer: COMMERCIAL

## 2020-02-05 VITALS
SYSTOLIC BLOOD PRESSURE: 126 MMHG | HEART RATE: 79 BPM | TEMPERATURE: 98.2 F | RESPIRATION RATE: 14 BRPM | DIASTOLIC BLOOD PRESSURE: 80 MMHG | WEIGHT: 174 LBS | HEIGHT: 63 IN | BODY MASS INDEX: 30.83 KG/M2 | OXYGEN SATURATION: 98 %

## 2020-02-05 DIAGNOSIS — Z23 ENCOUNTER FOR IMMUNIZATION: ICD-10-CM

## 2020-02-05 DIAGNOSIS — Z86.32 PERSONAL HISTORY OF GESTATIONAL DIABETES: ICD-10-CM

## 2020-02-05 PROCEDURE — 99385 PREV VISIT NEW AGE 18-39: CPT | Mod: 25

## 2020-02-05 PROCEDURE — G0008: CPT

## 2020-02-05 PROCEDURE — 90686 IIV4 VACC NO PRSV 0.5 ML IM: CPT

## 2020-02-05 NOTE — PHYSICAL EXAM
[No Acute Distress] : no acute distress [Well Developed] : well developed [Well Nourished] : well nourished [Normal] : no acute distress, well nourished, well developed and well-appearing [Normal Sclera/Conjunctiva] : normal sclera/conjunctiva [Well-Appearing] : well-appearing [EOMI] : extraocular movements intact [PERRL] : pupils equal round and reactive to light [Normal Outer Ear/Nose] : the outer ears and nose were normal in appearance [Normal Oropharynx] : the oropharynx was normal [Supple] : supple [No Lymphadenopathy] : no lymphadenopathy [No JVD] : no jugular venous distention [No Respiratory Distress] : no respiratory distress  [Thyroid Normal, No Nodules] : the thyroid was normal and there were no nodules present [Clear to Auscultation] : lungs were clear to auscultation bilaterally [No Accessory Muscle Use] : no accessory muscle use [Regular Rhythm] : with a regular rhythm [Normal Rate] : normal rate  [Normal S1, S2] : normal S1 and S2 [No Murmur] : no murmur heard [No Carotid Bruits] : no carotid bruits [No Abdominal Bruit] : a ~M bruit was not heard ~T in the abdomen [No Varicosities] : no varicosities [Pedal Pulses Present] : the pedal pulses are present [No Edema] : there was no peripheral edema [No Palpable Aorta] : no palpable aorta [No Extremity Clubbing/Cyanosis] : no extremity clubbing/cyanosis [Soft] : abdomen soft [Non Tender] : non-tender [No Masses] : no abdominal mass palpated [Non-distended] : non-distended [No HSM] : no HSM [Normal Bowel Sounds] : normal bowel sounds [Normal Posterior Cervical Nodes] : no posterior cervical lymphadenopathy [Normal Anterior Cervical Nodes] : no anterior cervical lymphadenopathy [No CVA Tenderness] : no CVA  tenderness [No Spinal Tenderness] : no spinal tenderness [Grossly Normal Strength/Tone] : grossly normal strength/tone [No Joint Swelling] : no joint swelling [No Rash] : no rash [No Focal Deficits] : no focal deficits [Coordination Grossly Intact] : coordination grossly intact [Deep Tendon Reflexes (DTR)] : deep tendon reflexes were 2+ and symmetric [Normal Gait] : normal gait [Normal Insight/Judgement] : insight and judgment were intact [Normal Affect] : the affect was normal [de-identified] : done by gyn in 12/2019

## 2020-02-05 NOTE — HEALTH RISK ASSESSMENT
[No falls in past year] : Patient reported no falls in the past year [No] : No [] : No [YearQuit] : 2018

## 2020-02-05 NOTE — REVIEW OF SYSTEMS
[Insomnia] : insomnia [Anxiety] : anxiety [Depression] : depression [Negative] : Heme/Lymph [Dizziness] : no dizziness

## 2020-02-05 NOTE — HISTORY OF PRESENT ILLNESS
[de-identified] : Pt with h/o depression which started after having her first child who is 7 and then went away after 5 yrs. Had a second child 2018 and depression came back. She restarted smoking after her 1st child. Stopped when she got pregnant again. After baby was born, she became depressed again. Continues to be depressed. Her PCP last year told her that she was depressed. She does go to a counselor weekly and was told she should be on medication. She was working in [place] and quit her job because it was too draining. \par \par She was born with a congenital heart condition. Had open heart surgery before she was pregnant with her first child. pt not sure of her condition but something with right ventricle. \par \par She breast feeds her 18 month on son occasionally.

## 2020-02-05 NOTE — PAST MEDICAL HISTORY
[Definite ___ (Date)] : the last menstrual period was [unfilled] [Menstruating] : menstruating [Regular Cycle Intervals] : have been regular [Total Preg ___] : G[unfilled] [Live Births ___] : P[unfilled]  [AB Spont ___] : miscarriages: [unfilled]

## 2020-03-01 LAB
25(OH)D3 SERPL-MCNC: 11.8 NG/ML
ALBUMIN SERPL ELPH-MCNC: 4.5 G/DL
ALP BLD-CCNC: 70 U/L
ALT SERPL-CCNC: 14 U/L
ANION GAP SERPL CALC-SCNC: 12 MMOL/L
APPEARANCE: ABNORMAL
AST SERPL-CCNC: 18 U/L
BACTERIA: ABNORMAL
BASOPHILS # BLD AUTO: 0.04 K/UL
BASOPHILS NFR BLD AUTO: 0.6 %
BILIRUB SERPL-MCNC: 0.5 MG/DL
BILIRUBIN URINE: NEGATIVE
BLOOD URINE: NEGATIVE
BUN SERPL-MCNC: 9 MG/DL
CALCIUM SERPL-MCNC: 9.2 MG/DL
CHLORIDE SERPL-SCNC: 105 MMOL/L
CHOLEST SERPL-MCNC: 185 MG/DL
CHOLEST/HDLC SERPL: 4.2 RATIO
CO2 SERPL-SCNC: 24 MMOL/L
COLOR: YELLOW
CREAT SERPL-MCNC: 0.83 MG/DL
EOSINOPHIL # BLD AUTO: 0.15 K/UL
EOSINOPHIL NFR BLD AUTO: 2.1 %
ESTIMATED AVERAGE GLUCOSE: 108 MG/DL
FOLATE SERPL-MCNC: 14.3 NG/ML
GLUCOSE QUALITATIVE U: NEGATIVE
GLUCOSE SERPL-MCNC: 90 MG/DL
HBA1C MFR BLD HPLC: 5.4 %
HCT VFR BLD CALC: 40.8 %
HDLC SERPL-MCNC: 44 MG/DL
HGB BLD-MCNC: 13.2 G/DL
HYALINE CASTS: 0 /LPF
IMM GRANULOCYTES NFR BLD AUTO: 0.1 %
KETONES URINE: NEGATIVE
LDLC SERPL CALC-MCNC: 109 MG/DL
LEUKOCYTE ESTERASE URINE: ABNORMAL
LYMPHOCYTES # BLD AUTO: 2.34 K/UL
LYMPHOCYTES NFR BLD AUTO: 32.5 %
MAN DIFF?: NORMAL
MCHC RBC-ENTMCNC: 27.9 PG
MCHC RBC-ENTMCNC: 32.4 GM/DL
MCV RBC AUTO: 86.3 FL
MICROSCOPIC-UA: NORMAL
MONOCYTES # BLD AUTO: 0.45 K/UL
MONOCYTES NFR BLD AUTO: 6.3 %
NEUTROPHILS # BLD AUTO: 4.21 K/UL
NEUTROPHILS NFR BLD AUTO: 58.4 %
NITRITE URINE: NEGATIVE
PH URINE: 7
PLATELET # BLD AUTO: 221 K/UL
POTASSIUM SERPL-SCNC: 5 MMOL/L
PROT SERPL-MCNC: 7.1 G/DL
PROTEIN URINE: ABNORMAL
RBC # BLD: 4.73 M/UL
RBC # FLD: 12.7 %
RED BLOOD CELLS URINE: 2 /HPF
SODIUM SERPL-SCNC: 141 MMOL/L
SPECIFIC GRAVITY URINE: 1.02
SQUAMOUS EPITHELIAL CELLS: 17 /HPF
TRIGL SERPL-MCNC: 163 MG/DL
TSH SERPL-ACNC: 1.02 UIU/ML
UROBILINOGEN URINE: NORMAL
VIT B12 SERPL-MCNC: 346 PG/ML
WBC # FLD AUTO: 7.2 K/UL
WHITE BLOOD CELLS URINE: 69 /HPF

## 2020-03-05 ENCOUNTER — APPOINTMENT (OUTPATIENT)
Dept: INTERNAL MEDICINE | Facility: CLINIC | Age: 40
End: 2020-03-05
Payer: COMMERCIAL

## 2020-03-05 VITALS
BODY MASS INDEX: 30.3 KG/M2 | RESPIRATION RATE: 14 BRPM | WEIGHT: 171 LBS | TEMPERATURE: 98.7 F | OXYGEN SATURATION: 98 % | DIASTOLIC BLOOD PRESSURE: 70 MMHG | SYSTOLIC BLOOD PRESSURE: 130 MMHG | HEART RATE: 85 BPM | HEIGHT: 63 IN

## 2020-03-05 DIAGNOSIS — E55.9 VITAMIN D DEFICIENCY, UNSPECIFIED: ICD-10-CM

## 2020-03-05 PROCEDURE — 99214 OFFICE O/P EST MOD 30 MIN: CPT

## 2020-03-05 NOTE — HISTORY OF PRESENT ILLNESS
[FreeTextEntry1] : depression [de-identified] : Continues to have significant depression with no energy and asking  to help her more. She lies on couch all day. Has a 8 y/o daughter at home and a son born in 2018. Had depression after having her daughter and it took 5 yrs to go away. She has not worked in 1.5 yrs. The medication has helped a little. She used to work in [place] and quit because of her extreme fatigue after the baby. She is very irritable and cannot stand it when her children cry. Gets drowsy from the lexapro. When she takes at night she is able to sleep well. Gets headaches from her children crying.Sees a counselor every other week.

## 2020-06-05 ENCOUNTER — APPOINTMENT (OUTPATIENT)
Dept: INTERNAL MEDICINE | Facility: CLINIC | Age: 40
End: 2020-06-05
Payer: COMMERCIAL

## 2020-06-05 PROCEDURE — 99442: CPT

## 2020-06-05 NOTE — HISTORY OF PRESENT ILLNESS
[Home] : at home, [unfilled] , at the time of the visit. [Medical Office: ([facility])___] : at the medical office located in  [Verbal consent obtained from patient] : the patient, [unfilled] [FreeTextEntry8] : cc: fatigue\par \par c/o her fatigue has been increasing. She is taking lexapro and feeling exhausted, tired, and not getting out of bed. Her depression is still there and not getting better. She is talking to her therapist biweekly. She cannot afford to talk more and not helping anyway. \par Feels good about one out of 7 days. 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 Has to do home schooling. her son is 2 and her daughter is older. Not breast feeding since Feb. She ran out of levothyroxine a week ago.

## 2020-06-05 NOTE — ASSESSMENT
[FreeTextEntry1] : meds renewed\par start bupropion\par speak to [facility]\par f/u in one month

## 2020-06-05 NOTE — REVIEW OF SYSTEMS
[Fatigue] : fatigue [Anxiety] : anxiety [Depression] : depression [Negative] : Heme/Lymph [Suicidal] : not suicidal

## 2020-07-15 ENCOUNTER — APPOINTMENT (OUTPATIENT)
Dept: INTERNAL MEDICINE | Facility: CLINIC | Age: 40
End: 2020-07-15
Payer: COMMERCIAL

## 2020-07-15 VITALS
RESPIRATION RATE: 14 BRPM | BODY MASS INDEX: 29.23 KG/M2 | WEIGHT: 165 LBS | DIASTOLIC BLOOD PRESSURE: 78 MMHG | SYSTOLIC BLOOD PRESSURE: 124 MMHG | OXYGEN SATURATION: 98 % | HEART RATE: 92 BPM | HEIGHT: 63 IN | TEMPERATURE: 99.1 F

## 2020-07-15 PROCEDURE — 99214 OFFICE O/P EST MOD 30 MIN: CPT

## 2020-07-15 NOTE — HISTORY OF PRESENT ILLNESS
[FreeTextEntry8] : cc: anxiety\par \par Pt c/o anxiety and just found out she is pregnant. She stopped bupropion and lexapro as soon as she found out she was pregnant. Has only been a few days. Has been irritable since stopping the meds. \par She did not plan to have another child. She is not sure if she is going to keep the pregnancy. Has 2 children at home 7.5 and 1 y/o. \par LMP was 6/3/2020.

## 2021-02-18 ENCOUNTER — APPOINTMENT (OUTPATIENT)
Dept: DERMATOLOGY | Facility: CLINIC | Age: 41
End: 2021-02-18

## 2021-07-30 ENCOUNTER — APPOINTMENT (OUTPATIENT)
Dept: INTERNAL MEDICINE | Facility: CLINIC | Age: 41
End: 2021-07-30

## 2022-03-21 PROBLEM — M25.562 LEFT KNEE PAIN, UNSPECIFIED CHRONICITY: Status: ACTIVE | Noted: 2022-03-21

## 2022-03-22 ENCOUNTER — APPOINTMENT (OUTPATIENT)
Dept: ORTHOPEDIC SURGERY | Facility: CLINIC | Age: 42
End: 2022-03-22

## 2022-03-22 DIAGNOSIS — M25.562 PAIN IN LEFT KNEE: ICD-10-CM

## 2023-01-23 ENCOUNTER — APPOINTMENT (OUTPATIENT)
Dept: INTERNAL MEDICINE | Facility: CLINIC | Age: 43
End: 2023-01-23
Payer: COMMERCIAL

## 2023-01-23 VITALS
RESPIRATION RATE: 14 BRPM | OXYGEN SATURATION: 98 % | DIASTOLIC BLOOD PRESSURE: 74 MMHG | HEIGHT: 63 IN | HEART RATE: 86 BPM | TEMPERATURE: 98.6 F | BODY MASS INDEX: 30.12 KG/M2 | WEIGHT: 170 LBS | SYSTOLIC BLOOD PRESSURE: 112 MMHG

## 2023-01-23 DIAGNOSIS — Z00.00 ENCOUNTER FOR GENERAL ADULT MEDICAL EXAMINATION W/OUT ABNORMAL FINDINGS: ICD-10-CM

## 2023-01-23 PROCEDURE — 99396 PREV VISIT EST AGE 40-64: CPT

## 2023-01-23 RX ORDER — ESCITALOPRAM OXALATE 20 MG/1
20 TABLET ORAL DAILY
Qty: 15 | Refills: 2 | Status: DISCONTINUED | COMMUNITY
Start: 2020-02-05 | End: 2023-01-23

## 2023-01-23 RX ORDER — BUPROPION HYDROCHLORIDE 100 MG/1
100 TABLET, FILM COATED, EXTENDED RELEASE ORAL
Qty: 30 | Refills: 2 | Status: DISCONTINUED | COMMUNITY
Start: 2020-07-15 | End: 2023-01-23

## 2023-01-23 RX ORDER — LEVOTHYROXINE SODIUM 0.09 MG/1
88 TABLET ORAL DAILY
Qty: 90 | Refills: 1 | Status: DISCONTINUED | COMMUNITY
Start: 2020-02-05 | End: 2023-01-23

## 2023-01-23 NOTE — HISTORY OF PRESENT ILLNESS
[de-identified] : LMP = 12/25/22. Saw gyn a few months back. Has 3 children vaginally delivered. \par \par c/o low energy and mood not good. \par \par Has right sided upper back pain which comes and goes for a couple months. She does lift her 1 y/o baby. Usually holds her on the left side. It hurts when she lies in bed. Had back pain in the past and P.T. did not help. Takes aleve at times which helps temporarily. \par \par got her covid vaccines and booster. Got flu shot.

## 2023-01-23 NOTE — HEALTH RISK ASSESSMENT
[Never] : Never [No] : No [0] : 2) Feeling down, depressed, or hopeless: Not at all (0) [PHQ-2 Negative - No further assessment needed] : PHQ-2 Negative - No further assessment needed [[id number]] : 0 [Fully functional (bathing, dressing, toileting, transferring, walking, feeding)] : Fully functional (bathing, dressing, toileting, transferring, walking, feeding) [Fully functional (using the telephone, shopping, preparing meals, housekeeping, doing laundry, using] : Fully functional and needs no help or supervision to perform IADLs (using the telephone, shopping, preparing meals, housekeeping, doing laundry, using transportation, managing medications and managing finances)

## 2023-01-24 LAB
25(OH)D3 SERPL-MCNC: 18.6 NG/ML
ALBUMIN SERPL ELPH-MCNC: 4.6 G/DL
ALP BLD-CCNC: 90 U/L
ALT SERPL-CCNC: 14 U/L
ANION GAP SERPL CALC-SCNC: 12 MMOL/L
AST SERPL-CCNC: 19 U/L
BASOPHILS # BLD AUTO: 0.04 K/UL
BASOPHILS NFR BLD AUTO: 0.4 %
BILIRUB SERPL-MCNC: 0.7 MG/DL
BUN SERPL-MCNC: 9 MG/DL
CALCIUM SERPL-MCNC: 9.4 MG/DL
CHLORIDE SERPL-SCNC: 101 MMOL/L
CHOLEST SERPL-MCNC: 204 MG/DL
CO2 SERPL-SCNC: 24 MMOL/L
CREAT SERPL-MCNC: 0.79 MG/DL
EGFR: 97 ML/MIN/1.73M2
EOSINOPHIL # BLD AUTO: 0.27 K/UL
EOSINOPHIL NFR BLD AUTO: 2.6 %
ESTIMATED AVERAGE GLUCOSE: 114 MG/DL
FOLATE SERPL-MCNC: 7.2 NG/ML
GLUCOSE SERPL-MCNC: 78 MG/DL
HBA1C MFR BLD HPLC: 5.6 %
HCT VFR BLD CALC: 39.7 %
HDLC SERPL-MCNC: 54 MG/DL
HGB BLD-MCNC: 12.7 G/DL
IMM GRANULOCYTES NFR BLD AUTO: 0.3 %
LDLC SERPL CALC-MCNC: 120 MG/DL
LYMPHOCYTES # BLD AUTO: 2.97 K/UL
LYMPHOCYTES NFR BLD AUTO: 28.3 %
MAN DIFF?: NORMAL
MCHC RBC-ENTMCNC: 27.4 PG
MCHC RBC-ENTMCNC: 32 GM/DL
MCV RBC AUTO: 85.7 FL
MONOCYTES # BLD AUTO: 0.52 K/UL
MONOCYTES NFR BLD AUTO: 5 %
NEUTROPHILS # BLD AUTO: 6.67 K/UL
NEUTROPHILS NFR BLD AUTO: 63.4 %
NONHDLC SERPL-MCNC: 150 MG/DL
PLATELET # BLD AUTO: 237 K/UL
POTASSIUM SERPL-SCNC: 4.4 MMOL/L
PROT SERPL-MCNC: 7.6 G/DL
RBC # BLD: 4.63 M/UL
RBC # FLD: 13 %
SODIUM SERPL-SCNC: 137 MMOL/L
TRIGL SERPL-MCNC: 148 MG/DL
TSH SERPL-ACNC: 2.28 UIU/ML
VIT B12 SERPL-MCNC: 346 PG/ML
WBC # FLD AUTO: 10.5 K/UL

## 2023-02-05 ENCOUNTER — NON-APPOINTMENT (OUTPATIENT)
Age: 43
End: 2023-02-05

## 2023-05-01 ENCOUNTER — TRANSCRIPTION ENCOUNTER (OUTPATIENT)
Age: 43
End: 2023-05-01

## 2023-05-01 ENCOUNTER — APPOINTMENT (OUTPATIENT)
Dept: INTERNAL MEDICINE | Facility: CLINIC | Age: 43
End: 2023-05-01
Payer: COMMERCIAL

## 2023-05-01 VITALS
RESPIRATION RATE: 16 BRPM | HEIGHT: 63 IN | HEART RATE: 104 BPM | SYSTOLIC BLOOD PRESSURE: 110 MMHG | BODY MASS INDEX: 29.06 KG/M2 | DIASTOLIC BLOOD PRESSURE: 56 MMHG | TEMPERATURE: 98.6 F | WEIGHT: 164 LBS | OXYGEN SATURATION: 98 %

## 2023-05-01 DIAGNOSIS — B36.9 SUPERFICIAL MYCOSIS, UNSPECIFIED: ICD-10-CM

## 2023-05-01 DIAGNOSIS — L21.9 SEBORRHEIC DERMATITIS, UNSPECIFIED: ICD-10-CM

## 2023-05-01 PROCEDURE — 99214 OFFICE O/P EST MOD 30 MIN: CPT

## 2023-05-01 NOTE — HISTORY OF PRESENT ILLNESS
[FreeTextEntry8] : cc: rash\par \par c/o rash on inner ulnar side of wrist. It was red at first and now brownish. It was itchy. \par \par She feels down at times. Had stopped wellbutrin and restarted. She states that she and her  are very different. Her kids are in school now.

## 2023-05-01 NOTE — PHYSICAL EXAM
[Normal] : no acute distress, well nourished, well developed and well-appearing [de-identified] : half dollar sized brownish lesion on right wrist, scalp dermatitis

## 2023-05-02 ENCOUNTER — TRANSCRIPTION ENCOUNTER (OUTPATIENT)
Age: 43
End: 2023-05-02

## 2023-05-17 RX ORDER — KETOCONAZOLE 20 MG/G
2 CREAM TOPICAL DAILY
Qty: 1 | Refills: 0 | Status: DISCONTINUED | COMMUNITY
Start: 2023-05-08 | End: 2023-05-17

## 2023-06-19 ENCOUNTER — APPOINTMENT (OUTPATIENT)
Dept: OBGYN | Facility: CLINIC | Age: 43
End: 2023-06-19
Payer: COMMERCIAL

## 2023-06-19 VITALS
DIASTOLIC BLOOD PRESSURE: 76 MMHG | BODY MASS INDEX: 29.06 KG/M2 | HEIGHT: 63 IN | SYSTOLIC BLOOD PRESSURE: 116 MMHG | WEIGHT: 164 LBS

## 2023-06-19 DIAGNOSIS — Z86.79 PERSONAL HISTORY OF OTHER DISEASES OF THE CIRCULATORY SYSTEM: ICD-10-CM

## 2023-06-19 DIAGNOSIS — F32.A ANXIETY DISORDER, UNSPECIFIED: ICD-10-CM

## 2023-06-19 DIAGNOSIS — Z86.39 PERSONAL HISTORY OF OTHER ENDOCRINE, NUTRITIONAL AND METABOLIC DISEASE: ICD-10-CM

## 2023-06-19 DIAGNOSIS — Z12.39 ENCOUNTER FOR OTHER SCREENING FOR MALIGNANT NEOPLASM OF BREAST: ICD-10-CM

## 2023-06-19 DIAGNOSIS — F41.9 ANXIETY DISORDER, UNSPECIFIED: ICD-10-CM

## 2023-06-19 DIAGNOSIS — Z11.3 ENCOUNTER FOR SCREENING FOR INFECTIONS WITH A PREDOMINANTLY SEXUAL MODE OF TRANSMISSION: ICD-10-CM

## 2023-06-19 DIAGNOSIS — F17.200 NICOTINE DEPENDENCE, UNSPECIFIED, UNCOMPLICATED: ICD-10-CM

## 2023-06-19 DIAGNOSIS — Z01.419 ENCOUNTER FOR GYNECOLOGICAL EXAMINATION (GENERAL) (ROUTINE) W/OUT ABNORMAL FINDINGS: ICD-10-CM

## 2023-06-19 PROCEDURE — 99386 PREV VISIT NEW AGE 40-64: CPT

## 2023-06-19 RX ORDER — BETAMETHASONE DIPROPIONATE 0.5 MG/G
0.05 OINTMENT, AUGMENTED TOPICAL
Qty: 1 | Refills: 0 | Status: DISCONTINUED | COMMUNITY
Start: 2020-01-02 | End: 2023-06-19

## 2023-06-19 RX ORDER — KETOCONAZOLE 20.5 MG/ML
2 SHAMPOO, SUSPENSION TOPICAL
Qty: 1 | Refills: 1 | Status: DISCONTINUED | COMMUNITY
Start: 2023-05-01 | End: 2023-06-19

## 2023-06-19 RX ORDER — ADHESIVE TAPE 3"X 2.3 YD
50 MCG TAPE, NON-MEDICATED TOPICAL
Qty: 30 | Refills: 5 | Status: DISCONTINUED | COMMUNITY
Start: 2020-03-05 | End: 2023-06-19

## 2023-06-19 RX ORDER — CLOTRIMAZOLE 10 MG/G
1 CREAM TOPICAL
Qty: 45 | Refills: 1 | Status: DISCONTINUED | COMMUNITY
Start: 2023-05-01 | End: 2023-06-19

## 2023-06-19 RX ORDER — BUPROPION HYDROCHLORIDE 150 MG/1
150 TABLET, EXTENDED RELEASE ORAL
Qty: 30 | Refills: 5 | Status: DISCONTINUED | COMMUNITY
Start: 2020-06-05 | End: 2023-06-19

## 2023-06-19 NOTE — HISTORY OF PRESENT ILLNESS
[Condoms] : uses condoms [Y] : Patient is sexually active [Frequency: Q ___ days] : menstrual periods occur approximately every [unfilled] days [Menarche Age: ____] : age at menarche was [unfilled] [Regular Cycle Intervals] : periods have been regular [PGxTotal] : 6 [[facility]] : 3 [PGHxPremature] : 0 [PGHxAbortions] : 3 [[facility] Center[facility]] : 3 [PGHxABInduced] : 3 [PGHxABSpont] : 0 [PGHxEctopic] : 0 [PGHxMultBirths] : 0

## 2023-06-20 LAB
C TRACH RRNA SPEC QL NAA+PROBE: NOT DETECTED
HBV SURFACE AG SER QL: NONREACTIVE
HCV AB SER QL: NONREACTIVE
HCV S/CO RATIO: 0.2 S/CO
HIV1+2 AB SPEC QL IA.RAPID: NONREACTIVE
HPV HIGH+LOW RISK DNA PNL CVX: NOT DETECTED
N GONORRHOEA RRNA SPEC QL NAA+PROBE: NOT DETECTED
SOURCE TP AMPLIFICATION: NORMAL
T PALLIDUM AB SER QL IA: NEGATIVE

## 2023-06-23 LAB — CYTOLOGY CVX/VAG DOC THIN PREP: NORMAL

## 2023-07-20 ENCOUNTER — APPOINTMENT (OUTPATIENT)
Dept: OBGYN | Facility: CLINIC | Age: 43
End: 2023-07-20
Payer: COMMERCIAL

## 2023-07-20 VITALS
DIASTOLIC BLOOD PRESSURE: 86 MMHG | SYSTOLIC BLOOD PRESSURE: 130 MMHG | WEIGHT: 158 LBS | HEIGHT: 63 IN | BODY MASS INDEX: 28 KG/M2

## 2023-07-20 DIAGNOSIS — Z98.890 OTHER SPECIFIED POSTPROCEDURAL STATES: ICD-10-CM

## 2023-07-20 PROCEDURE — 99213 OFFICE O/P EST LOW 20 MIN: CPT

## 2023-07-20 NOTE — CHIEF COMPLAINT
[Urgent Visit] : Urgent Visit [FreeTextEntry1] : The patient presents following a medical termination of pregnancy with medications she got on her own.  She states that the home pregnancy test remains positive.

## 2023-07-20 NOTE — PHYSICAL EXAM
[Chaperone Present] : A chaperone was present in the examining room during all aspects of the physical examination [Awake] : awake [Alert] : alert [Soft] : soft [Oriented x3] : oriented to person, place, and time [Normal] : uterus [No Bleeding] : there was no active vaginal bleeding [Uterine Adnexae] : were not tender and not enlarged [Acute Distress] : no acute distress [Tender] : non tender [Dilated] : the cervix was not dilated

## 2023-07-21 LAB
ABO + RH PNL BLD: NORMAL
HCG SERPL-MCNC: 227 MIU/ML

## 2023-07-26 ENCOUNTER — APPOINTMENT (OUTPATIENT)
Dept: OBGYN | Facility: CLINIC | Age: 43
End: 2023-07-26

## 2023-07-31 ENCOUNTER — APPOINTMENT (OUTPATIENT)
Dept: OBGYN | Facility: CLINIC | Age: 43
End: 2023-07-31

## 2023-10-12 ENCOUNTER — NON-APPOINTMENT (OUTPATIENT)
Age: 43
End: 2023-10-12

## 2023-10-18 ENCOUNTER — NON-APPOINTMENT (OUTPATIENT)
Age: 43
End: 2023-10-18

## 2023-10-18 ENCOUNTER — APPOINTMENT (OUTPATIENT)
Dept: INTERNAL MEDICINE | Facility: CLINIC | Age: 43
End: 2023-10-18
Payer: COMMERCIAL

## 2023-10-18 VITALS
DIASTOLIC BLOOD PRESSURE: 82 MMHG | BODY MASS INDEX: 29.06 KG/M2 | SYSTOLIC BLOOD PRESSURE: 126 MMHG | HEART RATE: 75 BPM | WEIGHT: 164 LBS | OXYGEN SATURATION: 99 % | HEIGHT: 63 IN | TEMPERATURE: 97.8 F | RESPIRATION RATE: 16 BRPM

## 2023-10-18 DIAGNOSIS — F32.A DEPRESSION, UNSPECIFIED: ICD-10-CM

## 2023-10-18 DIAGNOSIS — R42 DIZZINESS AND GIDDINESS: ICD-10-CM

## 2023-10-18 DIAGNOSIS — F41.9 ANXIETY DISORDER, UNSPECIFIED: ICD-10-CM

## 2023-10-18 PROCEDURE — 99214 OFFICE O/P EST MOD 30 MIN: CPT

## 2023-10-26 ENCOUNTER — APPOINTMENT (OUTPATIENT)
Dept: OBGYN | Facility: CLINIC | Age: 43
End: 2023-10-26
Payer: COMMERCIAL

## 2023-10-26 VITALS
SYSTOLIC BLOOD PRESSURE: 120 MMHG | DIASTOLIC BLOOD PRESSURE: 70 MMHG | BODY MASS INDEX: 28.92 KG/M2 | HEIGHT: 63 IN | WEIGHT: 163.2 LBS

## 2023-10-26 DIAGNOSIS — E03.9 HYPOTHYROIDISM, UNSPECIFIED: ICD-10-CM

## 2023-10-26 DIAGNOSIS — Z30.09 ENCOUNTER FOR OTHER GENERAL COUNSELING AND ADVICE ON CONTRACEPTION: ICD-10-CM

## 2023-10-26 DIAGNOSIS — Z87.891 PERSONAL HISTORY OF NICOTINE DEPENDENCE: ICD-10-CM

## 2023-10-26 LAB
HCG UR QL: NEGATIVE
QUALITY CONTROL: YES

## 2023-10-26 PROCEDURE — 99213 OFFICE O/P EST LOW 20 MIN: CPT | Mod: 25

## 2023-10-26 PROCEDURE — 81025 URINE PREGNANCY TEST: CPT

## 2024-04-11 ENCOUNTER — NON-APPOINTMENT (OUTPATIENT)
Age: 44
End: 2024-04-11

## 2024-08-12 ENCOUNTER — APPOINTMENT (OUTPATIENT)
Dept: ORTHOPEDIC SURGERY | Facility: CLINIC | Age: 44
End: 2024-08-12
Payer: COMMERCIAL

## 2024-08-12 VITALS
TEMPERATURE: 97.9 F | DIASTOLIC BLOOD PRESSURE: 84 MMHG | SYSTOLIC BLOOD PRESSURE: 131 MMHG | HEART RATE: 80 BPM | WEIGHT: 161 LBS | HEIGHT: 62 IN | BODY MASS INDEX: 29.63 KG/M2

## 2024-08-12 DIAGNOSIS — M25.561 PAIN IN RIGHT KNEE: ICD-10-CM

## 2024-08-12 DIAGNOSIS — M23.91 UNSPECIFIED INTERNAL DERANGEMENT OF RIGHT KNEE: ICD-10-CM

## 2024-08-12 PROCEDURE — 73562 X-RAY EXAM OF KNEE 3: CPT | Mod: RT

## 2024-08-12 PROCEDURE — 99203 OFFICE O/P NEW LOW 30 MIN: CPT

## 2024-08-12 NOTE — HISTORY OF PRESENT ILLNESS
[de-identified] : This is a 42yo female presenting with complaint of right knee pain. Patient reports in April she had a fall and has been having knee pain since. Pain is felt at inside of knee, worsened with bending and when she tries to walk on treadmill. She has tried a brace which has not helped her pain.

## 2024-08-12 NOTE — DISCUSSION/SUMMARY
[de-identified] : 42yo female with presentation consistent with a right knee internal derangement. She was referred to PT to try for 6-8 weeks. She will ice and use anti-inflammatories as needed for pain. She was given an MRI order to obtain if she continues to have issues with PT and will follow up if needed after MRI.  The patient was given the opportunity to ask questions, and all questions were answered to their satisfaction.

## 2024-08-12 NOTE — PHYSICAL EXAM
[de-identified] : grossly intact [de-identified] : General Exam: Appearance: well developed and nourished Orientation: Alert and oriented to person, place, time. Mood: mood and affect well-adjusted, pleasant and cooperative, appropriate for clinical and encounter circumstances  Right Knee: ROM: Flexion: 125 degrees. Extension: 0 degrees.      Skin: intact, no rashes or lesions. Inspection: +TTP medial joint space; normal alignment, no deformity, no warmth, no masses.   Quadriceps: Strength: 5/5, normal muscle tone.  Hamstring: Strength: 5/5, normal muscle tone.  Special tests: +Mildly positive [facility]'s exam [de-identified] : 3 views of the right knee obtained today show mild OA; no acute findings

## 2024-08-12 NOTE — DISCUSSION/SUMMARY
[de-identified] : 44yo female with presentation consistent with a right knee internal derangement. She was referred to PT to try for 6-8 weeks. She will ice and use anti-inflammatories as needed for pain. She was given an MRI order to obtain if she continues to have issues with PT and will follow up if needed after MRI.  The patient was given the opportunity to ask questions, and all questions were answered to their satisfaction.

## 2024-08-13 PROBLEM — M25.561 RIGHT KNEE PAIN, UNSPECIFIED CHRONICITY: Status: ACTIVE | Noted: 2024-08-12

## 2024-09-16 ENCOUNTER — APPOINTMENT (OUTPATIENT)
Dept: INTERNAL MEDICINE | Facility: CLINIC | Age: 44
End: 2024-09-16

## 2024-10-11 NOTE — PHYSICAL EXAM
Stable on PPI   [de-identified] : grossly intact [de-identified] : General Exam: Appearance: well developed and nourished Orientation: Alert and oriented to person, place, time. Mood: mood and affect well-adjusted, pleasant and cooperative, appropriate for clinical and encounter circumstances  Right Knee: ROM: Flexion: 125 degrees. Extension: 0 degrees.      Skin: intact, no rashes or lesions. Inspection: +TTP medial joint space; normal alignment, no deformity, no warmth, no masses.   Quadriceps: Strength: 5/5, normal muscle tone.  Hamstring: Strength: 5/5, normal muscle tone.  Special tests: +Mildly positive [facility]'s exam [de-identified] : 3 views of the right knee obtained today show mild OA; no acute findings

## 2025-02-09 ENCOUNTER — NON-APPOINTMENT (OUTPATIENT)
Age: 45
End: 2025-02-09

## 2025-04-28 NOTE — ED PROVIDER NOTE - CARE PLAN
No
Principal Discharge DX:	Atypical chest pain  Instructions for follow-up, activity and diet:	Return to the ED for any new or worsening symptoms  Take your medication as prescribed  Keflex 1 tab 2 times for UTI finish the prescription  Continue your pre- vitamins   Follow up with cardiology call tomorrow to schedule an appointment   Follow up with your OBGYN call tomorrow to schedule follow up   Increase your water intake   Advance activity as tolerated  Secondary Diagnosis:	Pregnancy  Secondary Diagnosis:	UTI (urinary tract infection)